# Patient Record
Sex: FEMALE | Race: OTHER | Employment: FULL TIME | URBAN - METROPOLITAN AREA
[De-identification: names, ages, dates, MRNs, and addresses within clinical notes are randomized per-mention and may not be internally consistent; named-entity substitution may affect disease eponyms.]

---

## 2021-07-07 ENCOUNTER — APPOINTMENT (OUTPATIENT)
Dept: CT IMAGING | Age: 21
End: 2021-07-07
Payer: MEDICAID

## 2021-07-07 ENCOUNTER — HOSPITAL ENCOUNTER (EMERGENCY)
Age: 21
Discharge: HOME | End: 2021-07-07
Payer: MEDICAID

## 2021-07-07 VITALS
DIASTOLIC BLOOD PRESSURE: 69 MMHG | SYSTOLIC BLOOD PRESSURE: 120 MMHG | HEART RATE: 75 BPM | WEIGHT: 140 LBS | OXYGEN SATURATION: 98 % | RESPIRATION RATE: 16 BRPM | TEMPERATURE: 98 F | HEIGHT: 63 IN | BODY MASS INDEX: 24.8 KG/M2

## 2021-07-07 DIAGNOSIS — R11.0 NAUSEA: ICD-10-CM

## 2021-07-07 DIAGNOSIS — R10.84 GENERALIZED ABDOMINAL PAIN: ICD-10-CM

## 2021-07-07 DIAGNOSIS — R19.7 DIARRHEA, UNSPECIFIED TYPE: Primary | ICD-10-CM

## 2021-07-07 DIAGNOSIS — E87.6 HYPOKALEMIA: ICD-10-CM

## 2021-07-07 LAB
ALBUMIN SERPL-MCNC: 4.6 G/DL (ref 3.5–5.2)
ALP BLD-CCNC: 54 U/L (ref 35–104)
ALT SERPL-CCNC: 26 U/L (ref 0–32)
ANION GAP SERPL CALCULATED.3IONS-SCNC: 12 MMOL/L (ref 7–16)
AST SERPL-CCNC: 22 U/L (ref 0–31)
BACTERIA: ABNORMAL /HPF
BASOPHILS ABSOLUTE: 0.04 E9/L (ref 0–0.2)
BASOPHILS RELATIVE PERCENT: 0.8 % (ref 0–2)
BILIRUB SERPL-MCNC: 1.1 MG/DL (ref 0–1.2)
BILIRUBIN URINE: NEGATIVE
BLOOD, URINE: NEGATIVE
BUN BLDV-MCNC: 11 MG/DL (ref 6–20)
CALCIUM SERPL-MCNC: 9.1 MG/DL (ref 8.6–10.2)
CHLORIDE BLD-SCNC: 104 MMOL/L (ref 98–107)
CLARITY: CLEAR
CO2: 24 MMOL/L (ref 22–29)
COLOR: YELLOW
CREAT SERPL-MCNC: 0.7 MG/DL (ref 0.5–1)
EOSINOPHILS ABSOLUTE: 0.33 E9/L (ref 0.05–0.5)
EOSINOPHILS RELATIVE PERCENT: 6.2 % (ref 0–6)
EPITHELIAL CELLS, UA: ABNORMAL /HPF
GFR AFRICAN AMERICAN: >60
GFR NON-AFRICAN AMERICAN: >60 ML/MIN/1.73
GLUCOSE BLD-MCNC: 78 MG/DL (ref 74–99)
GLUCOSE URINE: NEGATIVE MG/DL
HCG, URINE, POC: NEGATIVE
HCT VFR BLD CALC: 43 % (ref 34–48)
HEMOGLOBIN: 13.5 G/DL (ref 11.5–15.5)
IMMATURE GRANULOCYTES #: 0 E9/L
IMMATURE GRANULOCYTES %: 0 % (ref 0–5)
KETONES, URINE: NEGATIVE MG/DL
LACTIC ACID: 0.8 MMOL/L (ref 0.5–2.2)
LEUKOCYTE ESTERASE, URINE: NEGATIVE
LIPASE: 18 U/L (ref 13–60)
LYMPHOCYTES ABSOLUTE: 1.65 E9/L (ref 1.5–4)
LYMPHOCYTES RELATIVE PERCENT: 31.2 % (ref 20–42)
Lab: NORMAL
MAGNESIUM: 1.9 MG/DL (ref 1.6–2.6)
MCH RBC QN AUTO: 25.3 PG (ref 26–35)
MCHC RBC AUTO-ENTMCNC: 31.4 % (ref 32–34.5)
MCV RBC AUTO: 80.5 FL (ref 80–99.9)
MONOCYTES ABSOLUTE: 0.52 E9/L (ref 0.1–0.95)
MONOCYTES RELATIVE PERCENT: 9.8 % (ref 2–12)
MUCUS: PRESENT /LPF
NEGATIVE QC PASS/FAIL: NORMAL
NEUTROPHILS ABSOLUTE: 2.75 E9/L (ref 1.8–7.3)
NEUTROPHILS RELATIVE PERCENT: 52 % (ref 43–80)
NITRITE, URINE: NEGATIVE
PDW BLD-RTO: 14.1 FL (ref 11.5–15)
PH UA: 6 (ref 5–9)
PLATELET # BLD: 231 E9/L (ref 130–450)
PMV BLD AUTO: 10.5 FL (ref 7–12)
POSITIVE QC PASS/FAIL: NORMAL
POTASSIUM REFLEX MAGNESIUM: 3.1 MMOL/L (ref 3.5–5)
PROTEIN UA: NORMAL MG/DL
RBC # BLD: 5.34 E12/L (ref 3.5–5.5)
RBC UA: ABNORMAL /HPF (ref 0–2)
SODIUM BLD-SCNC: 140 MMOL/L (ref 132–146)
SPECIFIC GRAVITY UA: >=1.03 (ref 1–1.03)
TOTAL PROTEIN: 7.9 G/DL (ref 6.4–8.3)
UROBILINOGEN, URINE: 0.2 E.U./DL
WBC # BLD: 5.3 E9/L (ref 4.5–11.5)
WBC UA: ABNORMAL /HPF (ref 0–5)

## 2021-07-07 PROCEDURE — 83690 ASSAY OF LIPASE: CPT

## 2021-07-07 PROCEDURE — 99284 EMERGENCY DEPT VISIT MOD MDM: CPT

## 2021-07-07 PROCEDURE — 6360000004 HC RX CONTRAST MEDICATION: Performed by: RADIOLOGY

## 2021-07-07 PROCEDURE — 81001 URINALYSIS AUTO W/SCOPE: CPT

## 2021-07-07 PROCEDURE — 6370000000 HC RX 637 (ALT 250 FOR IP): Performed by: PHYSICIAN ASSISTANT

## 2021-07-07 PROCEDURE — 83735 ASSAY OF MAGNESIUM: CPT

## 2021-07-07 PROCEDURE — 83605 ASSAY OF LACTIC ACID: CPT

## 2021-07-07 PROCEDURE — 85025 COMPLETE CBC W/AUTO DIFF WBC: CPT

## 2021-07-07 PROCEDURE — 80053 COMPREHEN METABOLIC PANEL: CPT

## 2021-07-07 PROCEDURE — 74177 CT ABD & PELVIS W/CONTRAST: CPT

## 2021-07-07 RX ORDER — POTASSIUM CHLORIDE 20 MEQ/1
40 TABLET, EXTENDED RELEASE ORAL ONCE
Status: COMPLETED | OUTPATIENT
Start: 2021-07-07 | End: 2021-07-07

## 2021-07-07 RX ORDER — ONDANSETRON 4 MG/1
4 TABLET, ORALLY DISINTEGRATING ORAL EVERY 8 HOURS PRN
Qty: 10 TABLET | Refills: 0 | Status: SHIPPED | OUTPATIENT
Start: 2021-07-07 | End: 2022-05-25

## 2021-07-07 RX ORDER — DICYCLOMINE HYDROCHLORIDE 10 MG/1
20 CAPSULE ORAL 4 TIMES DAILY PRN
Qty: 20 CAPSULE | Refills: 0 | Status: SHIPPED | OUTPATIENT
Start: 2021-07-07 | End: 2022-05-25

## 2021-07-07 RX ADMIN — POTASSIUM CHLORIDE 40 MEQ: 1500 TABLET, EXTENDED RELEASE ORAL at 21:37

## 2021-07-07 RX ADMIN — IOPAMIDOL 75 ML: 755 INJECTION, SOLUTION INTRAVENOUS at 20:56

## 2021-07-07 NOTE — ED PROVIDER NOTES
ED Attending  CC: No                                                                                                                                      Department of Emergency Medicine   ED  Provider Note  Admit Date/RoomTime: 7/7/2021  6:37 PM  ED Room: 27/27        HPI:  7/7/21,   Time: 7:54 PM EDT         Jaky Rabago is a 19 y.o. female presenting to the ED for diarrhea and lower abdominal pain, beginning 5 days ago. The complaint has been intermittent, moderate in severity, and worsened by nothing. Patient presents to the emergency room with 5 days of lower abdominal pain and diarrhea. She states that prior to becoming ill she ate a traditional [address] [place] [address] dish that was made locally by another [address] [place] [address] woman. She states that no one else in the family had the same meal.  The patient states that her symptoms began soon after. She denies any recent antibiotics or travel. She is not pregnant. The patient denies hematuria, dysuria or polyuria. Her stools have been loose and watery. Denies any blood. She has had mild nausea but no vomiting. Patient has no history of inflammatory bowel disease. She is otherwise generally healthy.   She states that she has felt warm but has not documented any fevers or checked her temperature        ROS:     Constitutional: Negative for fever and chills  HENT: Negative for ear pain, sore throat and sinus pressure  Eyes: Negative for pain, discharge and redness  Respiratory:  Negative for shortness of breath, cough and wheezing  Cardiovascular: Negative for CP, edema or palpitations  Gastrointestinal:  See HPI  Genitourinary:  See HPI  Musculoskeletal: Negative for back pain and arthralgia  Skin: Negative for rash and wound  Neurological: Negative for weakness and headaches  Hematological: Negative for adenopathy    All other systems reviewed and are negative      -------------------------------- PAST HISTORY ----------------------------------  Past mg/dL    Total Protein 7.9 6.4 - 8.3 g/dL    Albumin 4.6 3.5 - 5.2 g/dL    Total Bilirubin 1.1 0.0 - 1.2 mg/dL    Alkaline Phosphatase 54 35 - 104 U/L    ALT 26 0 - 32 U/L    AST 22 0 - 31 U/L   Lipase   Result Value Ref Range    Lipase 18 13 - 60 U/L   Urinalysis, reflex to microscopic   Result Value Ref Range    Color, UA Yellow Straw/Yellow    Clarity, UA Clear Clear    Glucose, Ur Negative Negative mg/dL    Bilirubin Urine Negative Negative    Ketones, Urine Negative Negative mg/dL    Specific Gravity, UA >=1.030 1.005 - 1.030    Blood, Urine Negative Negative    pH, UA 6.0 5.0 - 9.0    Protein, UA TRACE Negative mg/dL    Urobilinogen, Urine 0.2 <2.0 E.U./dL    Nitrite, Urine Negative Negative    Leukocyte Esterase, Urine Negative Negative   Lactic Acid, Plasma   Result Value Ref Range    Lactic Acid 0.8 0.5 - 2.2 mmol/L   Microscopic Urinalysis   Result Value Ref Range    Mucus, UA Present (A) None Seen /LPF    WBC, UA 5-10 (A) 0 - 5 /HPF    RBC, UA 2-5 0 - 2 /HPF    Epithelial Cells, UA FEW /HPF    Bacteria, UA FEW (A) None Seen /HPF   Magnesium   Result Value Ref Range    Magnesium 1.9 1.6 - 2.6 mg/dL   POC Pregnancy Urine   Result Value Ref Range    HCG, Urine, POC Negative Negative    Lot Number [id number]     Positive QC Pass/Fail Acceptable     Negative QC Pass/Fail Acceptable        RADIOLOGY:  Interpreted by Radiologist.  CT ABDOMEN PELVIS W IV CONTRAST Additional Contrast? None   Final Result   Mild periportal edema which may be secondary to hepatic dysfunction or volume   overload. Fluid-filled nondilated small bowel which may represent enteritis. No bowel   obstruction. No other evidence of acute abdominal or pelvic pathology. ----------------- NURSING NOTES AND VITALS REVIEWED ---------------   The nursing notes within the ED encounter and vital signs as below have been reviewed.    /69   Pulse 75   Temp 98 °F (36.7 °C) (Oral)   Resp 16   Ht 5' 3\" (1.6 m)   Wt 140 lb (63.5 kg)   SpO2 98%   BMI 24.80 kg/m²   Oxygen Saturation Interpretation: Normal      --------------------------------PHYSICAL EXAM------------------------------------      Constitutional/General: Alert and oriented x3, well appearing, non toxic in NAD  Head: NC/AT  Eyes: PERRL, EOMI  Mouth: Oropharynx clear, handling secretions, no trismus  Neck: Supple, full ROM, no meningeal signs  Pulmonary: Lungs clear to auscultation bilaterally, no wheezes, rales, or rhonchi. Not in respiratory distress  Cardiovascular:  Regular rate and rhythm, no murmurs, gallops, or rubs. 2+ distal pulses  Abdomen: Soft, + BS. No distension. Mild diffuse abdominal tenderness. Nonfocal.  .  No palpable rigidity, rebound or guarding  Extremities: Moves all extremities x 4. Warm and well perfused  Skin: warm and dry without rash  Neurologic: GCS 15,  Intact. No focal deficits  Psych: Normal Affect      ------------------------ ED COURSE/MEDICAL DECISION MAKING----------------------  Medications   potassium chloride (KLOR-CON M) extended release tablet 40 mEq (40 mEq Oral Given 7/7/21 2137)   iopamidol (ISOVUE-370) 76 % injection 75 mL (75 mLs Intravenous Given 7/7/21 2056)         Medical Decision Making:    The patient was given a liter of fluids. Her labs did show a mild bump in her white blood count. CT scan of the abdomen and pelvis was pending at the completion of my shift. Care was transitioned to . Certainly if her CT of the is negative she will be discharged home with further instructions. She is aware and agreeable to this plan       Counseling: The emergency provider has spoken with the patient and discussed todays results, in addition to providing specific details for the plan of care and counseling regarding the diagnosis and prognosis.   Questions are answered at this time and they are agreeable with the plan.      ------------------------ IMPRESSION AND DISPOSITION -------------------------------    IMPRESSION  1. Diarrhea, unspecified type    2. Nausea    3. Hypokalemia    4.  Generalized abdominal pain        DISPOSITION  Disposition: Discharge to home  Patient condition is stable                   Chhaya Allan PA-C  07/09/21 3842

## 2021-07-07 NOTE — ED NOTES
FIRST PROVIDER CONTACT ASSESSMENT NOTE      Department of Emergency Medicine   7/7/21  2:30 PM EDT    Chief Complaint: Nausea (x5 days ), Diarrhea (x5 days ), and Abdominal Pain      History of Present Illness:   Domonique Mijares is a 19 y.o. female who presents to the ED for nausea and diarrhea for 5 days. She states she has generalized abdominal pain. She denies any urinary complaints. Denies any fevers. Denies any recent travel or recent antibiotic use. Medical History:  has no past medical history on file. Surgical History:  has no past surgical history on file. Social History:  reports that she has never smoked. She has never used smokeless tobacco. She reports that she does not drink alcohol and does not use drugs. Family History: family history is not on file. *ALLERGIES*     Patient has no known allergies.      Physical Exam:      VS:  BP 98/66   Pulse 93   Temp 97.4 °F (36.3 °C) (Tympanic)   Resp 15   Ht 5' 3\" (1.6 m)   Wt 140 lb (63.5 kg)   SpO2 99%   BMI 24.80 kg/m²      Initial Plan of Care:  Initiate Treatment-Testing, Proceed toTreatment Area When Bed Available for ED Attending/MLP to Continue Care    -----------------END OF FIRST PROVIDER CONTACT ASSESSMENT NOTE--------------  Electronically signed by NILESH Mcmillan CNP   DD: 7/7/21             NILESH Mcmillan CNP  07/07/21 0780

## 2022-05-18 ENCOUNTER — HOSPITAL ENCOUNTER (EMERGENCY)
Age: 22
Discharge: HOME | End: 2022-05-18
Attending: EMERGENCY MEDICINE
Payer: MEDICAID

## 2022-05-18 ENCOUNTER — APPOINTMENT (OUTPATIENT)
Dept: GENERAL RADIOLOGY | Age: 22
End: 2022-05-18
Payer: MEDICAID

## 2022-05-18 ENCOUNTER — APPOINTMENT (OUTPATIENT)
Dept: ULTRASOUND IMAGING | Age: 22
End: 2022-05-18
Payer: MEDICAID

## 2022-05-18 VITALS
OXYGEN SATURATION: 99 % | RESPIRATION RATE: 16 BRPM | TEMPERATURE: 97.5 F | DIASTOLIC BLOOD PRESSURE: 68 MMHG | HEIGHT: 68 IN | HEART RATE: 98 BPM | BODY MASS INDEX: 23.34 KG/M2 | WEIGHT: 154 LBS | SYSTOLIC BLOOD PRESSURE: 120 MMHG

## 2022-05-18 DIAGNOSIS — M79.605 LEFT LEG PAIN: Primary | ICD-10-CM

## 2022-05-18 PROCEDURE — 93971 EXTREMITY STUDY: CPT

## 2022-05-18 PROCEDURE — 99283 EMERGENCY DEPT VISIT LOW MDM: CPT

## 2022-05-18 PROCEDURE — 73610 X-RAY EXAM OF ANKLE: CPT

## 2022-05-18 RX ORDER — NAPROXEN 375 MG/1
375 TABLET ORAL 2 TIMES DAILY WITH MEALS
Qty: 60 TABLET | Refills: 0 | Status: SHIPPED | OUTPATIENT
Start: 2022-05-18

## 2022-05-18 ASSESSMENT — ENCOUNTER SYMPTOMS
ABDOMINAL PAIN: 0
BACK PAIN: 0
SHORTNESS OF BREATH: 0
COUGH: 0

## 2022-05-18 ASSESSMENT — PAIN - FUNCTIONAL ASSESSMENT: PAIN_FUNCTIONAL_ASSESSMENT: NONE - DENIES PAIN

## 2022-05-18 NOTE — ED PROVIDER NOTES
This is a 24-year-old female with no significant past medical history who presents to the ED for evaluation of leg pain. Patient states that for the past 1 to 2 days has been having some pain to her left calf and left ankle. Patient states she has not had any falls or trauma. Patient remarks that she is on birth control although has no swelling shortness of breath or chest pain. No reported dizziness on asking the patient. Patient remarks that she has no recent travel or recent surgeries. Patient is on her feet for work quite frequently denies any other heavy use repetitive use of her legs. No right-sided pain whatsoever. No other reported mitigating or exacerbating factors. The history is provided by the patient. Review of Systems   Constitutional: Negative for fever. HENT: Negative for congestion. Eyes: Negative for visual disturbance. Respiratory: Negative for cough and shortness of breath. Cardiovascular: Negative for chest pain. Gastrointestinal: Negative for abdominal pain. Endocrine: Negative for polyuria. Genitourinary: Negative for dysuria. Musculoskeletal: Negative for back pain. Skin: Negative for rash. Allergic/Immunologic: Negative for immunocompromised state. Neurological: Negative for headaches. Hematological: Does not bruise/bleed easily. Psychiatric/Behavioral: Negative for confusion. Physical Exam  Vitals and nursing note reviewed. Constitutional:       General: She is not in acute distress. Appearance: She is well-developed. HENT:      Head: Normocephalic and atraumatic. Mouth/Throat:      Mouth: Mucous membranes are moist.   Eyes:      Extraocular Movements: Extraocular movements intact. Neck:      Vascular: No JVD. Cardiovascular:      Rate and Rhythm: Normal rate and regular rhythm. Heart sounds: No murmur heard. Pulmonary:      Effort: Pulmonary effort is normal.      Breath sounds: No wheezing, rhonchi or rales. Chest:      Chest wall: No tenderness. Abdominal:      General: There is no distension. Palpations: Abdomen is soft. Tenderness: There is no abdominal tenderness. There is no guarding or rebound. Hernia: No hernia is present. Musculoskeletal:      Cervical back: Normal range of motion and neck supple. Right lower leg: No edema. Left lower leg: No edema. Comments: Negative homans sign   Skin:     General: Skin is warm and dry. Capillary Refill: Capillary refill takes less than 2 seconds. Neurological:      General: No focal deficit present. Mental Status: She is alert and oriented to person, place, and time. Cranial Nerves: No cranial nerve deficit. Psychiatric:         Mood and Affect: Mood normal.         Behavior: Behavior normal.          Procedures     MDM  Number of Diagnoses or Management Options  Left leg pain  Diagnosis management comments: Patient is a pleasant 64-year-old female who presented the ED for evaluation of calf and ankle pain that she noticed when she awoke today. Patient was nontoxic no distress however the concern was that she is on birth control was a DVT given there was no trauma recently. Compartments are soft patient neurovascular tact full range of motion. Ultrasound reassuring no signs of DVT imaging showed no signs of abnormalities patient was advised use ice elevation rest and given a short course of anti-inflammatories. Of note somewhere in the triage note says the patient was dizzy however on questioning patient she has no dizziness whatsoever. Patient appropriate for outpatient follow-up given return precautions.                  --------------------------------------------- PAST HISTORY ---------------------------------------------  Past Medical History:  has no past medical history on file. Past Surgical History:  has no past surgical history on file. Social History:  reports that she has never smoked.  She has never used smokeless tobacco. She reports that she does not drink alcohol and does not use drugs. Family History: family history is not on file. The patients home medications have been reviewed. Allergies: Patient has no known allergies. -------------------------------------------------- RESULTS -------------------------------------------------  Labs:  No results found for this visit on 05/18/22. Radiology:  US DUP LOWER EXTREMITY LEFT DELMY   Final Result   No evidence of DVT in the left lower extremity. XR ANKLE LEFT (MIN 3 VIEWS)   Final Result   No acute abnormality of the ankle.             ------------------------- NURSING NOTES AND VITALS REVIEWED ---------------------------  Date / Time Roomed:  5/18/2022  3:52 PM  ED Bed Assignment:  [facility]/[place] [id number]    The nursing notes within the ED encounter and vital signs as below have been reviewed. /68   Pulse 98   Temp 97.5 °F (36.4 °C) (Oral)   Resp 16   Ht 5' 8\" (1.727 m)   Wt 154 lb (69.9 kg)   LMP 04/22/2022   SpO2 99%   BMI 23.42 kg/m²   Oxygen Saturation Interpretation: Normal      ------------------------------------------ PROGRESS NOTES ------------------------------------------  8:37 PM EDT  I have spoken with the patient and discussed todays results, in addition to providing specific details for the plan of care and counseling regarding the diagnosis and prognosis. Their questions are answered at this time and they are agreeable with the plan. I discussed at length with them reasons for immediate return here for re evaluation. They will followup with their and the on call primary care physician, general surgeon and orthopedic physician by calling their office tomorrow and on Monday.      --------------------------------- ADDITIONAL PROVIDER NOTES ---------------------------------  At this time the patient is without objective evidence of an acute process requiring hospitalization or inpatient management.   They have remained hemodynamically stable throughout their entire ED visit and are stable for discharge with outpatient follow-up. The plan has been discussed in detail and they are aware of the specific conditions for emergent return, as well as the importance of follow-up. Discharge Medication List as of 5/18/2022  6:33 PM      START taking these medications    Details   naproxen (NAPROSYN) 375 MG tablet Take 1 tablet by mouth 2 times daily (with meals), Disp-60 tablet, R-0Print             Diagnosis:  1. Left leg pain        Disposition:  Patient's disposition: Discharge to home  Patient's condition is stable.            --------------------------------------------- PAST HISTORY ---------------------------------------------  Past Medical History:  has no past medical history on file. Past Surgical History:  has no past surgical history on file. Social History:  reports that she has never smoked. She has never used smokeless tobacco. She reports that she does not drink alcohol and does not use drugs. Family History: family history is not on file. The patients home medications have been reviewed. Allergies: Patient has no known allergies. -------------------------------------------------- RESULTS -------------------------------------------------  Labs:  No results found for this visit on 05/18/22. Radiology:  US DUP LOWER EXTREMITY LEFT DELMY   Final Result   No evidence of DVT in the left lower extremity. XR ANKLE LEFT (MIN 3 VIEWS)   Final Result   No acute abnormality of the ankle.             ------------------------- NURSING NOTES AND VITALS REVIEWED ---------------------------  Date / Time Roomed:  5/18/2022  3:52 PM  ED Bed Assignment:  [id number]    The nursing notes within the ED encounter and vital signs as below have been reviewed.    /68   Pulse 98   Temp 97.5 °F (36.4 °C) (Oral)   Resp 16   Ht 5' 8\" (1.727 m)   Wt 154 lb (69.9 kg)   LMP 04/22/2022   SpO2 99%   BMI 23.42 kg/m²   Oxygen Saturation Interpretation: Normal      ------------------------------------------ PROGRESS NOTES ------------------------------------------  8:35 PM EDT  I have spoken with the patient and discussed todays results, in addition to providing specific details for the plan of care and counseling regarding the diagnosis and prognosis. Their questions are answered at this time and they are agreeable with the plan. I discussed at length with them reasons for immediate return here for re evaluation. They will followup with their PCP      --------------------------------- ADDITIONAL PROVIDER NOTES ---------------------------------  At this time the patient is without objective evidence of an acute process requiring hospitalization or inpatient management. They have remained hemodynamically stable throughout their entire ED visit and are stable for discharge with outpatient follow-up. The plan has been discussed in detail and they are aware of the specific conditions for emergent return, as well as the importance of follow-up. Discharge Medication List as of 5/18/2022  6:33 PM      START taking these medications    Details   naproxen (NAPROSYN) 375 MG tablet Take 1 tablet by mouth 2 times daily (with meals), Disp-60 tablet, R-0Print             Diagnosis:  1. Left leg pain        Disposition:  Patient's disposition: Discharge to home  Patient's condition is stable.      Laura Gale DO  05/19/22 2192

## 2022-05-18 NOTE — Clinical Note
Marii De La Torre was seen and treated in our emergency department on 5/18/2022. She may return to work on 05/20/2022. If you have any questions or concerns, please don't hesitate to call.       Laura Gale, DO

## 2022-05-25 ENCOUNTER — OFFICE VISIT (OUTPATIENT)
Dept: PRIMARY CARE CLINIC | Age: 22
End: 2022-05-25
Payer: MEDICAID

## 2022-05-25 VITALS
BODY MASS INDEX: 24.92 KG/M2 | WEIGHT: 158.8 LBS | RESPIRATION RATE: 20 BRPM | OXYGEN SATURATION: 100 % | DIASTOLIC BLOOD PRESSURE: 60 MMHG | HEART RATE: 87 BPM | TEMPERATURE: 97.5 F | HEIGHT: 67 IN | SYSTOLIC BLOOD PRESSURE: 100 MMHG

## 2022-05-25 DIAGNOSIS — E87.6 HYPOKALEMIA: ICD-10-CM

## 2022-05-25 DIAGNOSIS — Z11.4 ENCOUNTER FOR SCREENING FOR HIV: ICD-10-CM

## 2022-05-25 DIAGNOSIS — Z11.59 NEED FOR HEPATITIS C SCREENING TEST: ICD-10-CM

## 2022-05-25 DIAGNOSIS — Z00.00 ANNUAL PHYSICAL EXAM: ICD-10-CM

## 2022-05-25 DIAGNOSIS — M54.50 RIGHT-SIDED LOW BACK PAIN WITHOUT SCIATICA, UNSPECIFIED CHRONICITY: ICD-10-CM

## 2022-05-25 DIAGNOSIS — M25.571 RIGHT ANKLE PAIN, UNSPECIFIED CHRONICITY: ICD-10-CM

## 2022-05-25 DIAGNOSIS — Z00.00 ANNUAL PHYSICAL EXAM: Primary | ICD-10-CM

## 2022-05-25 LAB
ANION GAP SERPL CALCULATED.3IONS-SCNC: 8 MMOL/L (ref 7–16)
BUN BLDV-MCNC: 8 MG/DL (ref 6–20)
CALCIUM SERPL-MCNC: 9 MG/DL (ref 8.6–10.2)
CHLORIDE BLD-SCNC: 104 MMOL/L (ref 98–107)
CO2: 27 MMOL/L (ref 22–29)
CREAT SERPL-MCNC: 0.7 MG/DL (ref 0.5–1)
GFR AFRICAN AMERICAN: >60
GFR NON-AFRICAN AMERICAN: >60 ML/MIN/1.73
GLUCOSE BLD-MCNC: 77 MG/DL (ref 74–99)
POTASSIUM SERPL-SCNC: 4.2 MMOL/L (ref 3.5–5)
SODIUM BLD-SCNC: 139 MMOL/L (ref 132–146)

## 2022-05-25 PROCEDURE — 99385 PREV VISIT NEW AGE 18-39: CPT | Performed by: STUDENT IN AN ORGANIZED HEALTH CARE EDUCATION/TRAINING PROGRAM

## 2022-05-25 ASSESSMENT — PATIENT HEALTH QUESTIONNAIRE - PHQ9
SUM OF ALL RESPONSES TO PHQ QUESTIONS 1-9: 0
SUM OF ALL RESPONSES TO PHQ QUESTIONS 1-9: 0
2. FEELING DOWN, DEPRESSED OR HOPELESS: 0
SUM OF ALL RESPONSES TO PHQ QUESTIONS 1-9: 0
SUM OF ALL RESPONSES TO PHQ QUESTIONS 1-9: 0
SUM OF ALL RESPONSES TO PHQ9 QUESTIONS 1 & 2: 0
1. LITTLE INTEREST OR PLEASURE IN DOING THINGS: 0

## 2022-05-25 NOTE — PATIENT INSTRUCTIONS
Patient Education        Hernia de disco: Ejercicios  Herniated Disc: Exercises  Introducción  Estos son [place] de ejercicios que usted puede probar. Los ejercicios pueden sugerirse para timmy afección o para la rehabilitación. Comience cada ejercicio lentamente. Reduzca la intensidad de los ejercicios si empieza asentir dolor. Se le informará cuándo comenzar a hacer estos ejercicios y cuáles funcionaránmejor para usted. Cómo mantenerse seguro  Estos ejercicios pueden ayudarlo a moverse con más facilidad y a sentirse mejor. Luna cuando comienza a hacerlos, puede sentir más dolor en la espalda. [place] es normal. Luna es importante que preste mucha atención a mc dolor durantey después de cada ejercicio.  Siga haciendo estos ejercicios si mc dolor permanece igual o si pasa de mc pierna y nalga (glúteo) más hacia la mitad de la columna vertebral. Es timmy buena señal que mc dolor se retire de mc pierna y nalga.  Deje de hacer estos ejercicios si el dolor empeora en mc pierna y nalga. Deje de hacer los ejercicios si comienza a sentir dolor en la pierna y en la nalga que no tenía antes. Asegúrese de hacer estos ejercicios en el orden en que aparecen. Antes deseguir con el siguiente, note cómo cambia el dolor. Si el dolor es mucho peor katie después del ejercicio y sigue peor al díasiguiente, no jessica ninguno de estos ejercicios. Cómo se hacen los ejercicios  1. Acuéstese boca abajo    1. Acuéstese boca abajo, con la ashli mirando hacia un lado. 2. Trate de relajar los músculos de la parte baja de la espalda todo lo que pueda. 3. Siga acostado boca abajo abhishek 2 minutos.  Mantenga los brazos a ambos lados del cuerpo.  Si smooth posición es incómoda para el cheryl, coloque las [address] la otra, debajo de la frente. [place] le ayudará a sostener la ashli y el cheryl.   Si estar acostado en esta posición le da dolor en la pierna o lo empeora, interrumpa isela ejercicio y no jessica los siguientes ejercicios. 2. Flexión para extender la espalda    1. Acuéstese boca abajo, mirando hacia abajo y con los codos flexionados a los costados y debajo de los hombros.  2. Presione con los codos hacia abajo contra el piso para elevar la parte superior de la espalda. 3. Mantenga esta posición por entre 15 y 27 segundos. 4. Repita de 2 a 4 veces.  Mientras hace esto, relaje los músculos del estómago y deje que la espalda se arquee sin usar los músculos de la espalda.  Deje que la parte baja de la espalda se relaje completamente mientras se arquea. [address] pelvis presionadas contra el piso sin levantarse. Luego relájese y vuelva a la posición inicial. Con el tiempo, practique hastamantenerse en la posición de flexión por hasta 2 minutos. Si estar acostado en esta posición le da dolor en la pierna o lo empeora, interrumpa isela ejercicio y no jessica los siguientes ejercicios. 3. Flexiones completas    1. Acuéstese sobre mc abdomen, boca abajo. Mantenga los codos apretados contra los costados y bajo los hombros.  2. Enderece los codos y [employer] parte superior del cuerpo hacia arriba tanto morro pueda. 3. Mantenga esta posición abhishek 5 segundos y luego relájese. 4. Repita 10 veces. Permita que la parte baja de la espalda se hunda. Mantenga sarahi caderas, pelvisy piernas relajadas. Cada vez, trate de elevar la parte superior de mc cuerpo un poco más arriba [place] brazos un poco más derechos. Si el dolor se propaga hacia abajo por la pierna o aumenta hacia abajo por la pierna, detenga isela ejercicio y no pase al siguiente ejercicio. Si no puede hacer isela ejercicio, puede intentar el ejercicio siguiente deinclinación hacia atrás. 4. Inclinación hacia atrás    1. Párese con los pies separados al ancho de las caderas y sin trabar [place]. 2. Coloque las tanika en la parte baja de la espalda a la altura de la cintura.   3. Inclínese hacia atrás lo [employer] pueda, manteniendo [place] estiradas. 4. Repita de 2 a 4 veces. Los dedos de los pies deben apuntar hacia adelante. Mantenga esta posición por 2 o 3 segundos. Luego vuelva a la posición inicial.  Cada vez, trate de inclinarse hacia atrás un poquito más, hasta que puedainclinarse hacia atrás lo más que pueda. Si pararse en esta posición le da dolor en la pierna o lo empeora, interrumpa isela ejercicio. La atención de seguimiento es timmy parte clave de mc tratamiento y seguridad. Asegúrese de hacer y acudir a todas las citas, y llame a mc médico si está teniendo problemas. También es timmy buena idea saber los [place] de susexámenes y mantener timmy lista de los medicamentos que aissatou. ¿Dónde puede encontrar más información en inglés? Isadora Webster a [email or web address] e ingrese a mc cuenta de MyChart. Josence Teo B024 en el Lester Quick \"Search Health Information\" para más información (en inglés) sobre \"Hernia de disco: Ejercicios. \"     Si no tiene timmy cuenta, jessica esther en el enlace \"Sign Up Now\". Revisado: 1 julio, 2021               Versión del contenido: 13.2  © 9403-5284 Healthwise, Incorporated. Las instrucciones de cuidado fueron adaptadas bajo licencia por [facility] ([facility]). Si usted tiene [place] afección médica o sobre estas instrucciones, siempre pregunte a mc profesional de danay. Healthwise, Incorporated niega toda garantía o responsabilidad por mc uso de esta información. Patient Education        Artritis de la parte baja de la espalda: Ejercicios  Low Back Arthritis: Exercises  Instrucciones de cuidado  Éstos son algunos ejemplos de ejercicios típicos de rehabilitación para mc afección. Comience cada ejercicio lentamente. Reduzca la intensidad delejercicio si [place] a sentir dolor. Mc médico o el fisioterapeuta le dirán cuándo puede comenzar con estosejercicios y cuáles funcionarán mejor para usted. Cuando no esté activo, encuentre timmy posición cómoda para descansar.  Algunas personas se sienten cómodas en el piso o en timmy cama de firmeza media con timmy almohada pequeña debajo de la ashli y otra debajo de sarahi rodillas. Otras personas prefieren acostarse de lado con timmy almohada entre las rodillas. Nopermanezca en timmy posición por [employer]. Dé paseos cortos (10 a 20 minutos) cada 2 a 3 horas. Evite las pendientes, las colinas y las escaleras hasta que se sienta mejor. Sólo camine distancias quepueda manejar sin dolor, especialmente dolor en las piernas. Cómo se hacen los ejercicios  Inclinación de la pelvis    1. Acuéstese boca arriba con las rodillas flexionadas. 2. \"Afirme\" mc estómago, apriete los músculos hundiéndolos e imaginando que mc ombligo se desplaza hacia la columna vertebral.  3. Laci presión hacia el piso con la parte baja de la espalda. Debe sentir que sarahi caderas y pelvis se balancean hacia atrás. 4. Mantenga la posición abhishek 6 segundos mientras respira de Jes pausada. 5. Relájese y deje que mc pelvis y caderas se balanceen hacia adelante. 6. Repita de 8 a 12 veces. Estiramientos de la espalda    1. [address] y las rodillas en el piso. 2. Relaje la ashli y [facility]. Arquee la espalda hacia el techo, hasta que sienta que las partes dk, media y baja se estiran agradablemente. Mantenga isela estiramiento abhishek el tiempo que se sienta cómodo, o de 15 a 30 segundos. 3. Vuelva a la posición inicial con la espalda plana mientras está apoyado de tanika y rodillas. 4. Deje que mc espalda se nivele empujando el [employer]. [address] (glúteos) hacia el techo. 5. Mantenga esta posición abhishek 15 a 30 segundos. 6. Repita de 2 a 4 veces. La atención de seguimiento es timmy parte clave de mc tratamiento y seguridad. Asegúrese de hacer y acudir a todas las citas, y llame a mc médico si está teniendo problemas. También es timmy buena idea saber los [place] de susexámenes y mantener timmy lista de los medicamentos que aissatou.   ¿Dónde puede encontrar más información en inglés? Letty Destini a [email or web address] e ingrese a mc cuenta de MyChart. Yoselin Karan A445 en el [facility] \"Search Health Information\" para más información (en inglés) sobre \"Artritis de la parte baja de la espalda: Ejercicios. \"     Si no tiene timmy cuenta, laci clic en el enlace \"Sign Up Now\". Revisado: 1 julio, 2021               Versión del contenido: 13.2  © 6350-9386 Healthwise, Incorporated. Las instrucciones de cuidado fueron adaptadas bajo licencia por [facility] ([facility]). Si usted tiene [place] afección médica o sobre estas instrucciones, siempre pregunte a mc profesional de danay. Healthwise, Incorporated niega toda garantía o responsabilidad por mc uso de esta información. Patient Education        Dolor en la parte baja de la espalda (lumbalgia): Ejercicios  Low Back Pain: Exercises  Instrucciones de cuidado  Aquí se presentan algunos ejemplos de ejercicios típicos de rehabilitación para tratar mc afección. Empiece cada ejercicio lentamente. Reduzca la intensidaddel ejercicio si [place] a tener dolor. Mc médico o fisioterapeuta le dirán cuándo puede comenzar con estos ejerciciosy cuáles funcionarán mejor para usted. Cómo hacer los ejercicios  [place]    1. Acuéstese boca abajo, apoyando mc cuerpo con los antebrazos. 2. Laci presión con los codos en el piso para elevar la espalda. Al hacer esto, relaje los músculos del estómago y permita que mc espalda se arquee sin usar los músculos de la espalda. Al hacer presión, evite que las caderas o la pelvis se separen del piso. 3. Mantenga la posición de 15 a 30 segundos y luego relájese. 4. Repita de 2 a 4 veces. Ejercicio de alternar brazo y pierna (berna de caza)    Laci isela ejercicio lentamente. Trate de mantener el cuerpo [place], y no deje que timmy cadera caiga más abajo que la [place]. 1. Comience con las tanika y las rodillas en el piso. 2. Contraiga los músculos del abdomen.   3. Buel Neema Joseph Moment del suelo y manténgala recta detrás de usted. Tenga cuidado de no dejar caer la cadera hacia abajo, porque eso hará que se le tuerza el tronco.  4. Mantenga la posición abhishek unos 6 segundos y luego baje la pierna y [place] a la otra pierna. 5. Repita de 8 a 12 veces con cada pierna. 6. Con el tiempo, vaya aumentando [employer] de 10 a 30 segundos cada vez.  7. Si se siente estable y seguro con la pierna elevada, intente levantar el brazo opuesto directamente enfrente de usted al [facility]. Ejercicio de rodillas al pecho    1. Acuéstese boca arriba con las rodillas flexionadas y los pies apoyados sobre el piso. 2. Lleve timmy rodilla hacia el pecho, manteniendo el otro pie apoyado en el suelo (o dejando la otra pierna recta, morro lo sienta mejor en la parte baja de la espalda). 3. Mantenga la parte baja de la espalda presionada contra el suelo. Sosténgalo por lo menos de 15 a 30 segundos. 4. Relájese y regrese la rodilla a la posición inicial.  5. Repita el ejercicio con la otra pierna. Repita de 2 a 4 veces con cada pierna. 6. Para lograr mayor estiramiento, deje la otra pierna apoyada en el suelo mientras se jose la rodilla [place]. Abdominales    1. Acuéstese en el suelo boca arriba, con las rodillas dobladas en un ángulo de 90 grados. Los pies deben estar apoyados en el suelo, a unas 12 pulgadas (30 cm) de las nalgas (glúteos). 2. Cruce los [facility]. Si esto le causa molestias en el cheryl, pruebe a poner las tanika detrás del cheryl (no de la ashli), con los codos abiertos. 3. Contraiga lentamente los músculos del abdomen y eleve los omóplatos del suelo. 4. Mantenga la ashli alineada con el cuerpo y no presione la barbilla hacia el pecho. 5. Sostenga esta posición por 1 o 2 segundos y después baje lentamente de nuevo hacia el suelo. 6. Repita de 8 a 12 veces. Ejercicio de inclinación de pelvis    1. Acuéstese de espalda con las rodillas flexionadas. 2.  \"Tense\" mc estómago. [place] significa apretar los músculos contrayendo el ombligo e imaginando que se mueve hacia la columna vertebral. Deberá sentir morro si la espalda estuviera ejerciendo presión sobre el suelo y que las caderas y la pelvis se balancean hacia atrás. 3. Mantenga la posición abhishek aproximadamente 6 segundos mientras respira suavemente. 4. Repita de 8 a 12 veces. Lopez con el talón    1. Acuéstese boca arriba con ambas rodillas flexionadas y los tobillos doblados de manera que solo los talones estén sobre el piso. Las rodillas deben estar dobladas más o menos a 90 grados. 2. A continuación jessica presión con los talones en el suelo, apriete las nalgas (glúteos) y levante las caderas del suelo hasta que los hombros, las caderas y las rodillas estén en línea recta. 3. Mantenga la posición abhishek unos 6 segundos mientras sigue respirando normalmente, y luego baje lentamente las caderas hacia el piso y descanse por hasta 10 segundos. [address] a 12 repeticiones. Estiramiento de isquiotibiales en el marivel de timmy nelly    1. Acuéstese boca arriba en el marivel de [place], con timmy pierna a través de la [place]. 2. Deslice la pierna hacia arriba por la pared, para enderezar la rodilla. Debe sentir un leve estiramiento en la parte posterior de la pierna. 3. Sostenga el estiramiento por lo menos de 15 a 30 segundos. No arquee la espalda, estire los dedos de los pies ni flexione las rodillas. Mantenga un talón tocando el suelo y el otro tocando la pared. 4. Repita con la otra pierna. 5. Repita de 2 a 4 veces con cada pierna. Estiramiento de los músculos flexores de la cadera    1. Póngase de rodillas en el suelo con timmy Jim Best y Maridee Stalling atrás. Coloque la rodilla de adelante encima del pie. Mantenga la otra rodilla en contacto con el suelo.   2. Empuje lentamente la cadera hacia adelante hasta que sienta un estiramiento en la parte superior del muslo de la pierna que [place] atrás.  3. Sostenga el estiramiento por lo menos de 15 a 30 segundos. Repita con la otra pierna. 4. Repita de 2 a 4 veces a cada lado. Sentadillas de pared    1. Párese con la espalda a entre 10 y 12 pulgadas (25 a 30 cm) de distancia de la pared. 2. Inclínese hacia la pared [employer] la espalda quede plana sobre jeff. 3. Deslícese lentamente hacia abajo hasta que las rodillas estén ligeramente flexionadas, presionando la parte baja de la espalda contra la pared. 4. Mantenga la posición abhishek unos 6 segundos y después deslícese hacia arriba por la pared. 5. Repita de 8 a 12 veces. La atención de seguimiento es timmy parte clave de mc tratamiento y seguridad. Asegúrese de hacer y acudir a todas las citas, y llame a mc médico si está teniendo problemas. También es timmy buena idea saber los [place] de susexámenes y mantener timmy lista de los medicamentos que aissatou. ¿Dónde puede encontrar más información en inglés? [facility] a [email or web address] e ingrese a mc cuenta de MyChart. Raúl So X771 en el Jessica Avelar \"Search Health Information\" para más información (en inglés) sobre \"Dolor en la parte baja de la espalda (lumbalgia): Ejercicios. \"     Si no tiene timmy cuenta, jessica esther en el enlace \"Sign Up Now\". Revisado: 1 julio, 2021               Versión del contenido: 13.2  © 8342-7925 Healthwise, [employer]. Las instrucciones de cuidado fueron adaptadas bajo licencia por [facility] ([facility]). Si usted tiene [place] afección médica o sobre estas instrucciones, siempre pregunte a mc profesional de danay. [place], Incorporated niega toda garantía o responsabilidad por mc uso de esta información. Patient Education        Dolor di en la parte baja de la espalda: Ejercicios  Acute Low Back Pain: Exercises  Instrucciones de cuidado  Éstos son algunos ejemplos de ejercicios típicos de rehabilitación para mc afección. Comience cada ejercicio lentamente.  Reduzca la intensidad delejercicio si Hector Mcmanus a sentir dolor. Mc médico o el fisioterapeuta le dirán cuándo puede comenzar con estosejercicios y cuáles funcionarán mejor para usted. Cuando no esté activo, encuentre timmy posición cómoda para descansar. Algunas personas se sienten cómodas en el piso o en timmy cama de firmeza media con timmy almohada pequeña debajo de la ashli y otra debajo de sarahi rodillas. Otras personas prefieren acostarse de lado con timmy almohada entre las rodillas. Nopermanezca en timmy posición por [facility]. Dé paseos cortos (10 a 20 minutos) cada 2 a 3 horas. Evite las pendientes, las colinas y las escaleras hasta que se sienta mejor. Sólo camine distancias quepueda manejar sin dolor, especialmente dolor en las piernas. Cómo se hacen los ejercicios  Estiramientos de la espalda    7. [address] [facility] y las rodillas en el piso. 8. Relaje la ashli y [facility]. Arquee la espalda hacia el techo, hasta que sienta que las partes dk, media y baja se estiran agradablemente. Mantenga isela estiramiento abhishek el tiempo que se sienta cómodo, o de 15 a 30 segundos. 9. Vuelva a la posición inicial con la espalda plana mientras está apoyado de tanika y rodillas. 10. Deje que mc espalda se nivele empujando el [facility]. [address] [facility] (glúteos) hacia el techo. 11. Mantenga esta posición abhishek 15 a 30 segundos. 12. Repita de 2 a 4 veces. La atención de seguimiento es timmy parte clave de mc tratamiento y seguridad. Asegúrese de hacer y acudir a todas las citas, y llame a mc médico si está teniendo problemas. También es timmy buena idea saber los [place] de susexámenes y mantener timmy lista de los medicamentos que aissatou. ¿Dónde puede encontrar más información en inglés? Aleksandar Horvath a [email or web address] e ingrese a mc cuenta de MyChart. [place] [address] en el [facility] Kevin \"Search Health Information\" para más información (en inglés) sobre \"Dolor di en la parte baja de la espalda: Ejercicios. \"     Si no tiene Jerrald Linda Jessica santana en el enlace \"Sign Up Now\". Revisado: 1 julio, 2021               Versión del contenido: 13.2  © 3676-6435 Healthwise, [employer]. Las instrucciones de cuidado fueron adaptadas bajo licencia por [facility] ([facility]). Si usted tiene [place] afección médica o sobre estas instrucciones, siempre pregunte a mc profesional de danay. S.N. [employer], [employer] niega toda garantía o responsabilidad por mc uso de esta información.

## 2022-05-25 NOTE — PROGRESS NOTES
[facility]  Department of Family Medicine      Patient:  Rafat Myrick 23 y.o. female     Date of Service: 5/25/22      Chief complaint:   Chief Complaint   Patient presents with   [address]     ED follow up          History ofPresent Illness   The patient is a 23 y.o. female  presented to the clinic with complaints as above. 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     -currently, feeling much improved, naproxen is helping a lot      Hx of gastritis/gerd  -well controlled, not on any medications and having no issues      Hx of right lesion of ankle - had ankle pain in past, got imaging, showed possible enostosis, has ankle pain from time to time, especially with squating to  animals   -did fall while in [employer] and hurt right ankle and right lower back  -states with working a lot or being on her feet a lot, will get worsening back pain with swelling  -moving bowels and urinating normally  -does take advil for this which helps sometimes   -can work through the pain     Past Medical History:  No past medical history on file. PastSurgical History:    History reviewed. No pertinent surgical history. Allergies:    Patient has no known allergies.     Social History:   Social History     Socioeconomic History    Marital status: Single     Spouse name: Not on file    Number of children: Not on file    Years of education: Not on file    Highest education level: Not on file   Occupational History    Not on file   Tobacco Use    Smoking status: Never Smoker    Smokeless tobacco: Never Used   Substance and Sexual Activity    Alcohol use: Never    Drug use: Never    Sexual activity: Yes     Partners: Male   Other Topics Concern    Not on file   Social History Narrative    Not on file     Social Determinants of Health     Financial Resource Strain:     Difficulty of Paying Living Expenses: Not on file   Food Insecurity:     Worried About Running Out of Food in the Last Year: Not on file    Silke of Food in the Last Year: Not on file   Transportation Needs:     Lack of Transportation (Medical): Not on file    Lack of Transportation (Non-Medical): Not on file   Physical Activity:     Days of Exercise per Week: Not on file    Minutes of Exercise per Session: Not on file   Stress:     Feeling of Stress : Not on file   Social Connections:     Frequency of Communication with Friends and Family: Not on file    Frequency of Social Gatherings with Friends and Family: Not on file    Attends Islam Services: Not on file    Active Member of [address] [place] [address] [employer] or Organizations: Not on file    Attends Club or Organization Meetings: Not on file    Marital Status: Not on file   Intimate Partner Violence:     Fear of Current or Ex-Partner: Not on file    Emotionally Abused: Not on file    Physically Abused: Not on file    Sexually Abused: Not on file   Housing Stability:     Unable to Pay for Housing in the Last Year: Not on file    Number of [place] in the Last Year: Not on file    Unstable Housing in the Last Year: Not on file        Family History:       Problem Relation Age of Onset    Diabetes Paternal Grandmother     Hypertension Paternal Grandmother     Cancer Paternal Grandmother     Cancer Paternal Grandfather        Review of Systems:   Review of Systems - as above     Physical Exam   Vitals: /60   Pulse 87   Temp 97.5 °F (36.4 °C) (Infrared)   Resp 20   Ht 5' 7\" (1.702 m)   Wt 158 lb 12.8 oz (72 kg)   SpO2 100%   BMI 24.87 kg/m²   Physical Exam  Constitutional:       Appearance: She is well-developed. HENT:      Head: Normocephalic. Cardiovascular:      Rate and Rhythm: Normal rate and regular rhythm. Heart sounds: Normal heart sounds.  No murmur heard.      Pulmonary:      Effort: Pulmonary effort is normal. No respiratory distress. Breath sounds: Normal breath sounds. No wheezing. Abdominal:      General: Bowel sounds are normal.      Palpations: Abdomen is soft. Musculoskeletal:         General: Tenderness present. Normal range of motion. Comments: Minimal pain with low back ROM testing   No pain with palpation of lateral malleolus    Skin:     General: Skin is warm and dry. Neurological:      Mental Status: She is alert and oriented to person, place, and time. Psychiatric:         Behavior: Behavior normal.         Assessment and Plan       1. Annual physical exam  Needs lab work and screening as below  - Basic Metabolic Panel; Future  - Hepatitis C Antibody; Future  - HIV Screen; Future    2. Right-sided low back pain without sciatica, unspecified chronicity  Has been going on for a while  -Patient able to tolerate pain and work through pain, will trial conservative measures for now with home exercises and PRN NSAID    3. Right ankle pain, unspecified chronicity  As above  -Has likely enostosis lesion, which is benign, gets ankle pain intermittently, will just watch for now, if worsening will re image     4. Hypokalemia  Incidental finding, will get repeat   - Basic Metabolic Panel; Future    5. Encounter for screening for HIV  HCM:  - HIV Screen; Future    6. Need for hepatitis C screening test  HCM:  - Hepatitis C Antibody; Future      Counseled regarding above diagnosis, including possible risks and complications,  especially if left uncontrolled. Counseled regarding the possible side effects, risks, benefits and alternatives to treatment;patient and/or guardian verbalizes understanding, agrees, feels comfortable with and wishes to proceed with above treatment plan.     Call or go to [address] [place] if symptoms worsen or persist. Advised patient to call with any new medication issues, and, as applicable, read all Rx info from pharmacy to assure aware of all possible risks and side effects of medicationbefore taking. Patient and/or guardian given opportunity to ask questions/raise concerns. The patient verbalized comfort and understanding ofinstructions. I encourage further reading and education about your health conditions. Information on many health conditions is provided by [facility] [employer]: [email or web address]. org/  Please bring any questions to me at your nextvisit. Return to Office: Return in about 1 year (around 5/25/2023), or if symptoms worsen or fail to improve, for annual physical .    Medication List:    Current Outpatient Medications   Medication Sig Dispense Refill    naproxen (NAPROSYN) 375 MG tablet Take 1 tablet by mouth 2 times daily (with meals) 60 tablet 0    levonorgestrel-ethinyl estradiol (AVIANE;ALESSE;LESSINA) 0.1-20 MG-MCG per tablet Take 1 tablet by mouth daily 1 packet 2     No current facility-administered medications for this visit. Kristine Beckford, DO       This document may have been prepared at least partially through the use of voice recognition software. Although effort is taken to assure the accuracy ofthis document, it is possible that grammatical, syntax,  or spelling errors may occur.

## 2022-05-26 LAB
HEPATITIS C ANTIBODY INTERPRETATION: NORMAL
HIV-1 AND HIV-2 ANTIBODIES: NORMAL

## 2022-06-13 ENCOUNTER — TELEPHONE (OUTPATIENT)
Dept: PRIMARY CARE CLINIC | Age: 22
End: 2022-06-13

## 2022-06-13 NOTE — TELEPHONE ENCOUNTER
Pt is requesting medication for tooth pain. Claudia [address], [address] [facility].  [facility] [phone] Eloy Homans [phone]   [address] [place] [address]., [address] [place] [id number]   Phone:  [phone]  Fax:  [phone]

## 2022-10-10 ENCOUNTER — HOSPITAL ENCOUNTER (EMERGENCY)
Age: 22
Discharge: HOME | End: 2022-10-10
Attending: EMERGENCY MEDICINE
Payer: MEDICAID

## 2022-10-10 VITALS
DIASTOLIC BLOOD PRESSURE: 79 MMHG | HEIGHT: 67 IN | OXYGEN SATURATION: 100 % | HEART RATE: 84 BPM | RESPIRATION RATE: 14 BRPM | TEMPERATURE: 97.3 F | SYSTOLIC BLOOD PRESSURE: 102 MMHG | BODY MASS INDEX: 23.54 KG/M2 | WEIGHT: 150 LBS

## 2022-10-10 DIAGNOSIS — J02.9 SORE THROAT: Primary | ICD-10-CM

## 2022-10-10 DIAGNOSIS — R09.82 POST-NASAL DRIP: ICD-10-CM

## 2022-10-10 LAB
HCG, URINE, POC: NEGATIVE
INFLUENZA A BY PCR: NOT DETECTED
INFLUENZA B BY PCR: NOT DETECTED
Lab: NORMAL
NEGATIVE QC PASS/FAIL: NORMAL
POSITIVE QC PASS/FAIL: NORMAL
SARS-COV-2, NAAT: NOT DETECTED
STREP GRP A PCR: NEGATIVE

## 2022-10-10 PROCEDURE — 87880 STREP A ASSAY W/OPTIC: CPT

## 2022-10-10 PROCEDURE — 87502 INFLUENZA DNA AMP PROBE: CPT

## 2022-10-10 PROCEDURE — 87635 SARS-COV-2 COVID-19 AMP PRB: CPT

## 2022-10-10 PROCEDURE — 99283 EMERGENCY DEPT VISIT LOW MDM: CPT

## 2022-10-10 RX ORDER — METHYLPREDNISOLONE 4 MG/1
TABLET ORAL
Qty: 1 KIT | Refills: 0 | Status: SHIPPED | OUTPATIENT
Start: 2022-10-10 | End: 2022-10-16

## 2022-10-10 ASSESSMENT — ENCOUNTER SYMPTOMS
RHINORRHEA: 0
VOMITING: 0
COUGH: 0
SHORTNESS OF BREATH: 0
ABDOMINAL PAIN: 0
NAUSEA: 0
DIARRHEA: 0
SORE THROAT: 1

## 2022-10-10 NOTE — ED PROVIDER NOTES
[facility]  ED Provider Note  Department of Emergency Medicine     ED Room: 34      Written by: Brenda Cook DO  Patient Name: Letitia Cooney  Attending Provider: sAhwin Pike DO  Admit Date: 10/10/2022  7:30 AM  MRN: [id number]    : 2000        Chief Complaint   Patient presents with    Pharyngitis     Since waking this am    - Chief complaint    HPI   Letitia Cooney is a 23 y.o. female presenting to the ED for evaluation of Pharyngitis (Since waking this am)      Patient is a 24-year-old female presenting to the ED for evaluation of sore throat since waking up this morning; patient states that she has a history of chronic sinusitis diagnosed when she lived in [facility] (moved to [place] in ); states for this reason she often breathes through her mouth and does sometimes get postnasal drip, states that she was getting some of that last night and thought maybe would improve when she got up this morning but when she woke up this morning she had more of a sore throat. States that it is worsened with swallowing. Denies any change in voice, no difficulty breathing, no shortness of breath, no numbness or tingling anywhere, no neck pain or stiffness. Patient states the reason she came to the ER is because when she was 13years old she had an episode where she had swelling in her right neck with pus, she is unsure if it had to be drained does not really remember much of the episode back when she was living in [facility] but does state that there was an infection there. She does not really feel like this currently but having a sore throat did worry her because of that history. Complaints are mild in severity, no specific alleviating factors, aggravated with swallowing, and began developing gradually since yesterday evening. Illnesses, fevers, cough, vomiting or diarrhea, abdominal pain, or abnormal urinary symptoms.     Review of Systems Constitutional:  Negative for chills and fever. HENT:  Positive for congestion (chronic, states hx chronic sinusitis), postnasal drip and sore throat. Negative for rhinorrhea. Eyes:  Negative for visual disturbance. Respiratory:  Negative for cough and shortness of breath. Cardiovascular:  Negative for chest pain and palpitations. Gastrointestinal:  Negative for abdominal pain, diarrhea, nausea and vomiting. Genitourinary:  Negative for dysuria and frequency. Musculoskeletal:  Negative for myalgias, neck pain and neck stiffness. Skin:  Negative for rash and wound. Neurological:  Negative for weakness, numbness and headaches. Psychiatric/Behavioral:  Negative for confusion. All other systems reviewed and are negative. Physical Exam  Vitals and nursing note reviewed. Constitutional:       General: She is not in acute distress. Appearance: She is not toxic-appearing. HENT:      Head: Normocephalic and atraumatic. Right Ear: Tympanic membrane and external ear normal.      Left Ear: Tympanic membrane and external ear normal.      Nose: Congestion present. No rhinorrhea. Mouth/Throat:      Mouth: Mucous membranes are moist. No oral lesions. Pharynx: Oropharynx is clear. Uvula midline. No pharyngeal swelling, oropharyngeal exudate, posterior oropharyngeal erythema or uvula swelling. Tonsils: No tonsillar exudate or tonsillar abscesses. Eyes:      Extraocular Movements: Extraocular movements intact. Conjunctiva/sclera: Conjunctivae normal.      Pupils: Pupils are equal, round, and reactive to light. Cardiovascular:      Rate and Rhythm: Normal rate and regular rhythm. Pulses: Normal pulses. Heart sounds: Normal heart sounds. Pulmonary:      Effort: Pulmonary effort is normal. No respiratory distress. Breath sounds: Normal breath sounds. No wheezing or rales.    Abdominal:      General: Bowel sounds are normal.      Palpations: Abdomen is soft.      Tenderness: There is no abdominal tenderness. There is no guarding. Musculoskeletal:         General: Normal range of motion. Cervical back: Normal range of motion and neck supple. Right lower leg: No edema. Left lower leg: No edema. Skin:     General: Skin is warm and dry. Capillary Refill: Capillary refill takes less than 2 seconds. Coloration: Skin is not jaundiced or pale. Findings: No erythema or rash. Neurological:      General: No focal deficit present. Mental Status: She is alert and oriented to person, place, and time. Psychiatric:         Mood and Affect: Mood normal.         Behavior: Behavior normal.        Procedures       MDM              Medical Decision Making: This is a 23 y.o. female presenting for evaluation of sore throat since yesterday evening, with post-nasal drip; exam as noted above. Patient alert and oriented on arrival, no acute distress; vital stable. Patient nontoxic in appearance. No meningeal signs. No swelling or redness of oropharynx is noted; no stridor, no exudates, no neck swelling redness or warmth is noted. Patient has no trismus, is handling secretions, normal phonation. Lungs CTA bilaterally. Labs reviewed, rapid flu, COVID, and strep are negative. Urine pregnancy is negative. Feel patient is stable and appropriate for discharge; she remains nontoxic in appearance. We will provide her with a prescription for Medrol Dosepak. Strict return precautions were discussed. Results and plan discussed, patient is amenable to this plan. See ED COURSE for additional MDM. Labs & imaging were reviewed and interpreted, see RESULTS. I have personally reviewed all laboratory and imaging results for this patient. I have discussed this patient with my attending, who has seen the patient and agrees with this disposition. Patient was seen and evaluated by myself and my attending Geovanna Teixeira DO.  Assessment and Plan discussed with attending provider, please see attestation for final plan of care.         --------------------------------------------- PAST HISTORY ---------------------------------------------  Past Medical History:  has no past medical history on file. Past Surgical History:  has no past surgical history on file. Social History:  reports that she has never smoked. She has never used smokeless tobacco. She reports that she does not drink alcohol and does not use drugs. Family History: family history includes Cancer in her paternal grandfather and paternal grandmother; Diabetes in her paternal grandmother; Hypertension in her paternal grandmother. Unless otherwise noted, family history is non contributory. The patients home medications have been reviewed. Allergies: Patient has no known allergies.     -------------------------------------------------- RESULTS -------------------------------------------------  Labs:  Results for orders placed or performed during the hospital encounter of 10/10/22   COVID-19, Rapid    Specimen: Nasopharyngeal Swab   Result Value Ref Range    SARS-CoV-2, NAAT Not Detected Not Detected   Rapid influenza A/B antigens    Specimen: Nasopharyngeal   Result Value Ref Range    Influenza A by PCR Not Detected Not Detected    Influenza B by PCR Not Detected Not Detected   Strep Screen Group A Throat    Specimen: Throat   Result Value Ref Range    Strep Grp A PCR Negative Negative   POC Pregnancy Urine   Result Value Ref Range    HCG, Urine, POC Negative Negative    Lot Number [id number]     Positive QC Pass/Fail Pass     Negative QC Pass/Fail Pass        Radiology:  No orders to display       Interpreted by the radiologist unless otherwise specified.      ------------------------- NURSING NOTES AND VITALS REVIEWED ---------------------------  Date / Time Roomed:  10/10/2022  7:30 AM  ED Bed Assignment:  01/15    The nursing notes within the ED encounter and vital signs as below have been reviewed by myself. /79   Pulse 84   Temp 97.3 °F (36.3 °C) (Temporal)   Resp 14   Ht 5' 7\" (1.702 m)   Wt 150 lb (68 kg)   SpO2 100%   BMI 23.49 kg/m²   Oxygen Saturation Interpretation: Normal    The patients available past medical records and past encounters were reviewed. ------------------------------------------ PROGRESS NOTES ------------------------------------------  9:48 AM EDT  I have spoken with the patient and discussed todays results, in addition to providing specific details for the plan of care and counseling regarding the diagnosis and prognosis. Their questions are answered at this time and they are agreeable with the plan. I discussed at length with them reasons for immediate return here for re evaluation. They will followup with their primary care physician by calling their office tomorrow. --------------------------------- ADDITIONAL PROVIDER NOTES ---------------------------------  At this time the patient is without objective evidence of an acute process requiring hospitalization or inpatient management. They have remained hemodynamically stable throughout their entire ED visit and are stable for discharge with outpatient follow-up. The plan has been discussed in detail and they are aware of the specific conditions for emergent return, as well as the importance of follow-up. New Prescriptions    METHYLPREDNISOLONE (MEDROL, BETI,) 4 MG TABLET    Take by mouth as instructed on kit. Diagnosis:  1. Sore throat    2. Post-nasal drip        Disposition:  Patient's disposition: Discharge to home  Patient's condition is stable. Felicia Weller D.O. PGY-3     Resident Physician     Emergency Medicine      10/10/2022 7:43 AM      NOTE: This report was transcribed using voice recognition software.  Every effort was made to ensure accuracy; however, inadvertent computerized transcription errors may be present             [facility], DO  Resident  10/10/22 [id number]

## 2022-10-10 NOTE — Clinical Note
Chang Porter was seen and treated in our emergency department on 10/10/2022. She may return to work on 10/12/2022. If you have any questions or concerns, please don't hesitate to call.       Oksana Frey, DO

## 2022-10-10 NOTE — Clinical Note
Chelly Campoverde was seen and treated in our emergency department on 10/10/2022. She may return to work on 10/12/2022. If you have any questions or concerns, please don't hesitate to call.       Trista Pena, DO

## 2023-02-10 ENCOUNTER — APPOINTMENT (OUTPATIENT)
Dept: ULTRASOUND IMAGING | Age: 23
End: 2023-02-10
Payer: MEDICAID

## 2023-02-10 ENCOUNTER — HOSPITAL ENCOUNTER (EMERGENCY)
Age: 23
Discharge: HOME | End: 2023-02-10
Attending: EMERGENCY MEDICINE
Payer: MEDICAID

## 2023-02-10 VITALS
DIASTOLIC BLOOD PRESSURE: 72 MMHG | BODY MASS INDEX: 22.88 KG/M2 | OXYGEN SATURATION: 97 % | SYSTOLIC BLOOD PRESSURE: 105 MMHG | WEIGHT: 151 LBS | RESPIRATION RATE: 16 BRPM | HEART RATE: 99 BPM | TEMPERATURE: 97.2 F | HEIGHT: 68 IN

## 2023-02-10 DIAGNOSIS — R11.2 NAUSEA AND VOMITING, UNSPECIFIED VOMITING TYPE: ICD-10-CM

## 2023-02-10 DIAGNOSIS — N30.00 ACUTE CYSTITIS WITHOUT HEMATURIA: Primary | ICD-10-CM

## 2023-02-10 DIAGNOSIS — E86.0 DEHYDRATION: ICD-10-CM

## 2023-02-10 LAB
ALBUMIN SERPL-MCNC: 4.3 G/DL (ref 3.5–5.2)
ALP BLD-CCNC: 55 U/L (ref 35–104)
ALT SERPL-CCNC: 11 U/L (ref 0–32)
ANION GAP SERPL CALCULATED.3IONS-SCNC: 10 MMOL/L (ref 7–16)
AST SERPL-CCNC: 30 U/L (ref 0–31)
BACTERIA: ABNORMAL /HPF
BASOPHILS ABSOLUTE: 0.01 E9/L (ref 0–0.2)
BASOPHILS RELATIVE PERCENT: 0.2 % (ref 0–2)
BILIRUB SERPL-MCNC: 1.5 MG/DL (ref 0–1.2)
BILIRUBIN URINE: ABNORMAL
BLOOD, URINE: NEGATIVE
BUN BLDV-MCNC: 11 MG/DL (ref 6–20)
CALCIUM SERPL-MCNC: 9.2 MG/DL (ref 8.6–10.2)
CHLORIDE BLD-SCNC: 101 MMOL/L (ref 98–107)
CLARITY: CLEAR
CO2: 26 MMOL/L (ref 22–29)
COLOR: YELLOW
CREAT SERPL-MCNC: 0.7 MG/DL (ref 0.5–1)
EOSINOPHILS ABSOLUTE: 0.04 E9/L (ref 0.05–0.5)
EOSINOPHILS RELATIVE PERCENT: 0.9 % (ref 0–6)
EPITHELIAL CELLS, UA: ABNORMAL /HPF
GFR SERPL CREATININE-BSD FRML MDRD: >60 ML/MIN/1.73
GLUCOSE BLD-MCNC: 84 MG/DL (ref 74–99)
GLUCOSE URINE: NEGATIVE MG/DL
HCG(URINE) PREGNANCY TEST: NEGATIVE
HCT VFR BLD CALC: 37.9 % (ref 34–48)
HEMOGLOBIN: 11.7 G/DL (ref 11.5–15.5)
IMMATURE GRANULOCYTES #: 0 E9/L
IMMATURE GRANULOCYTES %: 0 % (ref 0–5)
KETONES, URINE: 40 MG/DL
LACTIC ACID: 0.6 MMOL/L (ref 0.5–2.2)
LEUKOCYTE ESTERASE, URINE: ABNORMAL
LIPASE: 13 U/L (ref 13–60)
LYMPHOCYTES ABSOLUTE: 0.58 E9/L (ref 1.5–4)
LYMPHOCYTES RELATIVE PERCENT: 12.8 % (ref 20–42)
MCH RBC QN AUTO: 24.2 PG (ref 26–35)
MCHC RBC AUTO-ENTMCNC: 30.9 % (ref 32–34.5)
MCV RBC AUTO: 78.5 FL (ref 80–99.9)
MONOCYTES ABSOLUTE: 0.5 E9/L (ref 0.1–0.95)
MONOCYTES RELATIVE PERCENT: 11 % (ref 2–12)
MUCUS: PRESENT /LPF
NEUTROPHILS ABSOLUTE: 3.41 E9/L (ref 1.8–7.3)
NEUTROPHILS RELATIVE PERCENT: 75.1 % (ref 43–80)
NITRITE, URINE: NEGATIVE
OVALOCYTES: ABNORMAL
PDW BLD-RTO: 14.8 FL (ref 11.5–15)
PH UA: 5.5 (ref 5–9)
PLATELET # BLD: 230 E9/L (ref 130–450)
PMV BLD AUTO: 10.7 FL (ref 7–12)
POIKILOCYTES: ABNORMAL
POTASSIUM SERPL-SCNC: 4.3 MMOL/L (ref 3.5–5)
PROTEIN UA: NEGATIVE MG/DL
RBC # BLD: 4.83 E12/L (ref 3.5–5.5)
RBC UA: ABNORMAL /HPF (ref 0–2)
SARS-COV-2, NAAT: NOT DETECTED
SODIUM BLD-SCNC: 137 MMOL/L (ref 132–146)
SPECIFIC GRAVITY UA: >=1.03 (ref 1–1.03)
TOTAL PROTEIN: 7.5 G/DL (ref 6.4–8.3)
UROBILINOGEN, URINE: 0.2 E.U./DL
WBC # BLD: 4.5 E9/L (ref 4.5–11.5)
WBC UA: ABNORMAL /HPF (ref 0–5)

## 2023-02-10 PROCEDURE — 76705 ECHO EXAM OF ABDOMEN: CPT

## 2023-02-10 PROCEDURE — 6370000000 HC RX 637 (ALT 250 FOR IP): Performed by: STUDENT IN AN ORGANIZED HEALTH CARE EDUCATION/TRAINING PROGRAM

## 2023-02-10 PROCEDURE — 96375 TX/PRO/DX INJ NEW DRUG ADDON: CPT

## 2023-02-10 PROCEDURE — A4216 STERILE WATER/SALINE, 10 ML: HCPCS | Performed by: STUDENT IN AN ORGANIZED HEALTH CARE EDUCATION/TRAINING PROGRAM

## 2023-02-10 PROCEDURE — 80053 COMPREHEN METABOLIC PANEL: CPT

## 2023-02-10 PROCEDURE — 83690 ASSAY OF LIPASE: CPT

## 2023-02-10 PROCEDURE — 2580000003 HC RX 258: Performed by: STUDENT IN AN ORGANIZED HEALTH CARE EDUCATION/TRAINING PROGRAM

## 2023-02-10 PROCEDURE — 6360000002 HC RX W HCPCS: Performed by: STUDENT IN AN ORGANIZED HEALTH CARE EDUCATION/TRAINING PROGRAM

## 2023-02-10 PROCEDURE — 96374 THER/PROPH/DIAG INJ IV PUSH: CPT

## 2023-02-10 PROCEDURE — 87088 URINE BACTERIA CULTURE: CPT

## 2023-02-10 PROCEDURE — 81025 URINE PREGNANCY TEST: CPT

## 2023-02-10 PROCEDURE — 2500000003 HC RX 250 WO HCPCS: Performed by: STUDENT IN AN ORGANIZED HEALTH CARE EDUCATION/TRAINING PROGRAM

## 2023-02-10 PROCEDURE — 83605 ASSAY OF LACTIC ACID: CPT

## 2023-02-10 PROCEDURE — 85025 COMPLETE CBC W/AUTO DIFF WBC: CPT

## 2023-02-10 PROCEDURE — 81001 URINALYSIS AUTO W/SCOPE: CPT

## 2023-02-10 PROCEDURE — 99284 EMERGENCY DEPT VISIT MOD MDM: CPT

## 2023-02-10 PROCEDURE — 96361 HYDRATE IV INFUSION ADD-ON: CPT

## 2023-02-10 PROCEDURE — 87635 SARS-COV-2 COVID-19 AMP PRB: CPT

## 2023-02-10 RX ORDER — PHENAZOPYRIDINE HYDROCHLORIDE 100 MG/1
100 TABLET, FILM COATED ORAL 3 TIMES DAILY PRN
Qty: 9 TABLET | Refills: 0 | Status: SHIPPED | OUTPATIENT
Start: 2023-02-10 | End: 2023-02-13

## 2023-02-10 RX ORDER — FENTANYL CITRATE 50 UG/ML
25 INJECTION, SOLUTION INTRAMUSCULAR; INTRAVENOUS ONCE
Status: COMPLETED | OUTPATIENT
Start: 2023-02-10 | End: 2023-02-10

## 2023-02-10 RX ORDER — ONDANSETRON 2 MG/ML
4 INJECTION INTRAMUSCULAR; INTRAVENOUS ONCE
Status: COMPLETED | OUTPATIENT
Start: 2023-02-10 | End: 2023-02-10

## 2023-02-10 RX ORDER — FAMOTIDINE 20 MG/1
10 TABLET, FILM COATED ORAL 2 TIMES DAILY
Qty: 7 TABLET | Refills: 0 | Status: SHIPPED | OUTPATIENT
Start: 2023-02-10 | End: 2023-02-17

## 2023-02-10 RX ORDER — 0.9 % SODIUM CHLORIDE 0.9 %
1000 INTRAVENOUS SOLUTION INTRAVENOUS ONCE
Status: COMPLETED | OUTPATIENT
Start: 2023-02-10 | End: 2023-02-10

## 2023-02-10 RX ORDER — NITROFURANTOIN 25; 75 MG/1; MG/1
100 CAPSULE ORAL ONCE
Status: COMPLETED | OUTPATIENT
Start: 2023-02-10 | End: 2023-02-10

## 2023-02-10 RX ORDER — ONDANSETRON 4 MG/1
4 TABLET, ORALLY DISINTEGRATING ORAL EVERY 12 HOURS PRN
Qty: 12 TABLET | Refills: 0 | Status: SHIPPED | OUTPATIENT
Start: 2023-02-10 | End: 2023-02-16

## 2023-02-10 RX ORDER — NITROFURANTOIN 25; 75 MG/1; MG/1
100 CAPSULE ORAL 2 TIMES DAILY
Qty: 20 CAPSULE | Refills: 0 | Status: SHIPPED | OUTPATIENT
Start: 2023-02-10 | End: 2023-02-20

## 2023-02-10 RX ADMIN — NITROFURANTOIN (MONOHYDRATE/MACROCRYSTALS) 100 MG: 75; 25 CAPSULE ORAL at 12:12

## 2023-02-10 RX ADMIN — SODIUM CHLORIDE 1000 ML: 9 INJECTION, SOLUTION INTRAVENOUS at 08:59

## 2023-02-10 RX ADMIN — FENTANYL CITRATE 25 MCG: 50 INJECTION, SOLUTION INTRAMUSCULAR; INTRAVENOUS at 09:01

## 2023-02-10 RX ADMIN — LIDOCAINE HYDROCHLORIDE: 20 SOLUTION ORAL; TOPICAL at 11:31

## 2023-02-10 RX ADMIN — ONDANSETRON 4 MG: 2 INJECTION INTRAMUSCULAR; INTRAVENOUS at 09:01

## 2023-02-10 RX ADMIN — FAMOTIDINE 20 MG: 10 INJECTION, SOLUTION INTRAVENOUS at 09:00

## 2023-02-10 ASSESSMENT — PAIN SCALES - GENERAL
PAINLEVEL_OUTOF10: 10
PAINLEVEL_OUTOF10: 10

## 2023-02-10 ASSESSMENT — LIFESTYLE VARIABLES
HOW MANY STANDARD DRINKS CONTAINING ALCOHOL DO YOU HAVE ON A TYPICAL DAY: PATIENT DOES NOT DRINK
HOW OFTEN DO YOU HAVE A DRINK CONTAINING ALCOHOL: NEVER

## 2023-02-10 ASSESSMENT — PAIN DESCRIPTION - ORIENTATION: ORIENTATION: MID

## 2023-02-10 ASSESSMENT — PAIN DESCRIPTION - DESCRIPTORS
DESCRIPTORS: BURNING
DESCRIPTORS: BURNING

## 2023-02-10 ASSESSMENT — PAIN DESCRIPTION - LOCATION
LOCATION: ABDOMEN
LOCATION: ABDOMEN

## 2023-02-10 NOTE — ED PROVIDER NOTES
ATTENDING PROVIDER ATTESTATION:     Jojo Abrams presented to the emergency department for evaluation of Emesis (X3 since yesterday ) and Diarrhea (Since yesterday )   and was initially evaluated by the Medical Resident. See Original ED Note for H&P and ED course above. I have reviewed and discussed the case, including pertinent history (medical, surgical, family and social) and exam findings with the Medical Resident assigned to Jojo Abrams. I have personally performed and/or participated in the history, exam, medical decision making, and procedures and agree with all pertinent clinical information and any additional changes or corrections are noted below in my assessment and plan. I have discussed this patient in detail with the resident, and provided the instruction and education,       I have reviewed my findings and recommendations with the assigned Medical Resident, Jojo Abrams and members of family present at the time of disposition. I have performed a history and physical examination of this patient and directly supervised the resident caring for this patient              [address]        Pt Name: Jojo Abrams  MRN: [id number]  [place] 2000  Date of evaluation: 2/10/2023  Provider: Igor Smith MD  PCP: Suly Rosa DO  Note Started: 8:22 AM EST 2/10/23    CHIEF COMPLAINT       Chief Complaint   Patient presents with    Emesis     X3 since yesterday     Diarrhea     Since yesterday        HISTORY OF PRESENT ILLNESS: 1 or more Elements     Limitations to history : None    Jojo Abrams is a 20 y.o. female who presents for nausea, vomiting, diarrhea, abdominal pain. Patient reports that since yesterday she has had nonbloody, nonbilious emesis as well as nonbloody diarrhea. She reports upper abdominal pain as well.   No fevers or chills. No sick contacts. No trauma. No pelvic pain. No urinary symptoms. No lower abdominal pain. She denies any other complaints. Nursing Notes were all reviewed and agreed with or any disagreements were addressed in the HPI. REVIEW OF EXTERNAL NOTE :       1/31/23 OBGYN visit    Controlled Substance Monitoring:    Acute and Chronic Pain Monitoring:   No flowsheet data found. REVIEW OF SYSTEMS :      Positives and Pertinent negatives as per HPI. SURGICAL HISTORY   No past surgical history on file. [address]       Discharge Medication List as of 2/10/2023 12:26 PM        CONTINUE these medications which have NOT CHANGED    Details   levonorgestrel-ethinyl estradiol (AVIANE;ALESSE;LESSINA) 0.1-20 MG-MCG per tablet Take 1 tablet by mouth daily, Disp-1 packet, R-11Normal      naproxen (NAPROSYN) 375 MG tablet Take 1 tablet by mouth 2 times daily (with meals), Disp-60 tablet, R-0Print             ALLERGIES     Patient has no known allergies.     FAMILYHISTORY       Family History   Problem Relation Age of Onset    Diabetes Paternal Grandmother     Hypertension Paternal Grandmother     Cancer Paternal Grandmother     Cancer Paternal Grandfather         SOCIAL HISTORY       Social History     Tobacco Use    Smoking status: Never    Smokeless tobacco: Never   Substance Use Topics    Alcohol use: Never    Drug use: Never       SCREENINGS        [place] Coma Scale  Eye Opening: Spontaneous  Best Verbal Response: Oriented  Best Motor Response: Obeys commands  [place] Coma Scale Score: 15                CIWA Assessment  BP: 105/72  Heart Rate: 99           PHYSICAL EXAM  1 or more Elements     ED Triage Vitals [02/10/23 0748]   BP Temp Temp src Heart Rate Resp SpO2 Height Weight   105/72 97.2 °F (36.2 °C) -- 99 16 97 % 5' 8\" (1.727 m) 151 lb (68.5 kg)                Constitutional/General: Alert and oriented x3  Head: Normocephalic and atraumatic  Eyes: PERRL, EOMI, conjunctiva normal, sclera non icteric  ENT:  Oropharynx clear, handling secretions, no trismus, no asymmetry of the posterior oropharynx or uvular edema  Neck: Supple, full ROM, no stridor, no meningeal signs  Respiratory: Lungs clear to auscultation bilaterally, no wheezes, rales, or rhonchi. Not in respiratory distress  Cardiovascular:  Regular rate. Regular rhythm. No murmurs, no gallops, no rubs. 2+ distal pulses. Equal extremity pulses. GI:  Abdomen Soft, very mild epigastric abdominal tenderness. No lower abdominal tenderness. No tenderness to McBurney's point. Negative Neves sign. No other abdominal tenderness. , Non distended. No rebound, guarding, or rigidity. No pulsatile masses. Musculoskeletal: Moves all extremities x 4. Warm and well perfused, no clubbing, no cyanosis, no edema. Capillary refill <3 seconds  Integument: skin warm and dry. No rashes. Neurologic: GCS 15, no focal deficits,            DIAGNOSTIC RESULTS   LABS: Interpreted by Simone Oconnor MD    Labs Reviewed   CBC WITH AUTO DIFFERENTIAL - Abnormal; Notable for the following components:       Result Value    MCV 78.5 (*)     MCH 24.2 (*)     MCHC 30.9 (*)     Lymphocytes % 12.8 (*)     Lymphocytes Absolute 0.58 (*)     Eosinophils Absolute 0.04 (*)     All other components within normal limits   COMPREHENSIVE METABOLIC PANEL - Abnormal; Notable for the following components:     Total Bilirubin 1.5 (*)     All other components within normal limits   URINALYSIS WITH MICROSCOPIC - Abnormal; Notable for the following components:    Bilirubin Urine SMALL (*)     Ketones, Urine 40 (*)     Leukocyte Esterase, Urine TRACE (*)     Mucus, UA Present (*)     Bacteria, UA MODERATE (*)     All other components within normal limits   COVID-19, RAPID   CULTURE, URINE   LACTIC ACID   LIPASE   PREGNANCY, URINE       As interpreted by me, the above displayed labs are abnormal. All other labs obtained during this visit were within normal range or not returned as of this dictation. RADIOLOGY:   Unless a wet read is documented in MDM or ED course,  non-plain film images such as CT, Ultrasound and MRI are read by the radiologist. Plain radiographic images are visualized and preliminarily interpreted by the ED Provider with the findings documented in the ED course:    Interpretation per the Radiologist below, if available at the time of this note:    [address]   Final Result   Unremarkable right upper quadrant ultrasound. No results found. No results found. PROCEDURES   Unless otherwise noted below, none       PAST MEDICAL HISTORY/Chronic Conditions Affecting Care      has no past medical history on file.      EMERGENCY DEPARTMENT COURSE    Vitals:    Vitals:    02/10/23 0748 02/10/23 0808   BP: 105/72    Pulse: 99    Resp: 16    Temp: 97.2 °F (36.2 °C)    SpO2: 97%    Weight: 151 lb (68.5 kg) 151 lb (68.5 kg)   Height: 5' 8\" (1.727 m) 5' 8\" (1.727 m)       Patient was given the following medications:  Medications   0.9 % sodium chloride bolus (0 mLs IntraVENous Stopped 2/10/23 1118)   ondansetron (ZOFRAN) injection 4 mg (4 mg IntraVENous Given 2/10/23 0901)   fentaNYL (SUBLIMAZE) injection 25 mcg (25 mcg IntraVENous Given 2/10/23 0901)   famotidine (PEPCID) 20 mg in sodium chloride (PF) 0.9 % 10 mL injection (20 mg IntraVENous Given 2/10/23 0900)   aluminum & magnesium hydroxide-simethicone (MAALOX) 30 mL, lidocaine viscous hcl (XYLOCAINE) 5 mL (GI COCKTAIL) ( Oral Given 2/10/23 1131)   nitrofurantoin (macrocrystal-monohydrate) (MACROBID) capsule 100 mg (100 mg Oral Given 2/10/23 1212)              Medical Decision Making/Differential Diagnosis:    CC/HPI Summary, Social Determinants of health, Records Reviewed, DDx, testing done/not done, ED Course, Reassessment, disposition considerations/shared decision making with patient, consults, disposition:      ED Course as of 02/10/23 2042   Fri Feb 10, 2023   1006 My independent interpretation of labs showed normal COVID, normal lipase, normal CMP, CBC normal [CD]   1105 US GALLBLADDER RUQ  IMPRESSION:  Unremarkable right upper quadrant ultrasound. [PW]   1113 Ketones, Urine(!): 40  Some dehydration present, IV fluids were given. [PW]   2509 HCG(Urine) Pregnancy Test: NEGATIVE [PW]   6500 Bacteria, UA(!): MODERATE [PW]   1200 Consulted with pharmacist who recommended Macrobid for urinary tract infection. [PW]   1201 The patient by results. She states she is feeling better at this time. [PW]      ED Course User Index  [CD] Kai Puente MD  [PW] Blas Hernandez,           Medical Decision Making  Differential includes but not limited to gastroenteritis, gastritis, pancreatitis, cholecystitis to name a few. She has no lower abdominal pain and no signs of appendicitis. She has no pelvic pain or urinary symptoms. Nothing to suggest appendicitis or cholecystitis. I independently interpreted the [address] and there were no gallstones. Labs reassuring. Sx improving. No vomiting here. Considered admission but she improved and testing was reassuring. Will recommend supportive care    Problems Addressed:  Acute cystitis without hematuria: acute illness or injury  Dehydration: acute illness or injury  Nausea and vomiting, unspecified vomiting type: acute illness or injury    Amount and/or Complexity of Data Reviewed  Labs: ordered. Decision-making details documented in ED Course. Radiology: ordered. Decision-making details documented in ED Course. Risk  Prescription drug management. CONSULTS:   None              CRITICAL CARE TIME (.cct)           I am the Primary Clinician of Record. FINAL IMPRESSION      1. Acute cystitis without hematuria    2. Nausea and vomiting, unspecified vomiting type    3.  Dehydration          DISPOSITION/PLAN     DISPOSITION Decision To Discharge 02/10/2023 12:15:52 PM      PATIENT REFERRED TO:  [facility] [place] [phone]    Call in 3 days  For follow up    DISCHARGE MEDICATIONS:  Discharge Medication List as of 2/10/2023 12:26 PM        START taking these medications    Details   nitrofurantoin, macrocrystal-monohydrate, (MACROBID) 100 MG capsule Take 1 capsule by mouth 2 times daily for 10 days, Disp-20 capsule, R-0Print      ondansetron (ZOFRAN-ODT) 4 MG disintegrating tablet Take 1 tablet by mouth every 12 hours as needed for Nausea or Vomiting, Disp-12 tablet, R-0Print      phenazopyridine (PYRIDIUM) 100 MG tablet Take 1 tablet by mouth 3 times daily as needed for Pain, Disp-9 tablet, R-0Print      famotidine (PEPCID) 20 MG tablet Take 0.5 tablets by mouth 2 times daily for 7 days, Disp-7 tablet, R-0Print             DISCONTINUED MEDICATIONS:  Discharge Medication List as of 2/10/2023 12:26 PM                 (Please note that portions of this note were completed with a voice recognition program.  Efforts were made to edit the dictations but occasionally words are mis-transcribed.)    Delmy Meek MD (electronically signed)            Ita Gaviria MD  02/10/23 2047

## 2023-02-10 NOTE — ED PROVIDER NOTES
Name: Madiha Ortez    MRN: [id number]     Date / Time Roomed:  2/10/2023  8:00 AM  ED Bed Assignment:  25/25    ------------------ History of Present Illness --------------------  2/10/23, Time: 8:37 AM EST   Chief Complaint   Patient presents with    Emesis     X3 since yesterday     Diarrhea     Since yesterday       HPI    Madiha Ortez is a 20 y.o. female, with no previous med history, who presents to the ED today for epigastric pain as well as nausea and vomiting and diarrhea which started yesterday evening. Patient relates she has had 2 episodes of vomiting as well as 2 episodes of diarrhea since yesterday evening and some intermittent burning epigastric pain, which she relates 10 out of 10. Patiently she has had some increased urinary frequency as well. Denies any changes in dietary habits. Denies any association of her pain with eating. Denies any vaginal discharge, bleeding or concern for STI. Denies any back pain, chest pain, shortness of breath or fevers. Denies any known sick contacts. Denies any blood in the vomit or stool. Denies any urinary discomfort. The pt denies other ROS at this time. PCP: Evi Monday, DO.    -------------------- PMH --------------------  Past Medical History:  has no past medical history on file. Past Surgical History:  has no past surgical history on file. Social History:  reports that she has never smoked. She has never used smokeless tobacco. She reports that she does not drink alcohol and does not use drugs. Family History: family history includes Cancer in her paternal grandfather and paternal grandmother; Diabetes in her paternal grandmother; Hypertension in her paternal grandmother. Allergies: Patient has no known allergies. The patients past medical history has been reviewed.     -------------------- Current Meds --------------------  Meds: No current facility-administered medications for this encounter. Current Outpatient Medications:     nitrofurantoin, macrocrystal-monohydrate, (MACROBID) 100 MG capsule, Take 1 capsule by mouth 2 times daily for 10 days, Disp: 20 capsule, Rfl: 0    ondansetron (ZOFRAN-ODT) 4 MG disintegrating tablet, Take 1 tablet by mouth every 12 hours as needed for Nausea or Vomiting, Disp: 12 tablet, Rfl: 0    phenazopyridine (PYRIDIUM) 100 MG tablet, Take 1 tablet by mouth 3 times daily as needed for Pain, Disp: 9 tablet, Rfl: 0    famotidine (PEPCID) 20 MG tablet, Take 0.5 tablets by mouth 2 times daily for 7 days, Disp: 7 tablet, Rfl: 0    levonorgestrel-ethinyl estradiol (AVIANE;ALESSE;LESSINA) 0.1-20 MG-MCG per tablet, Take 1 tablet by mouth daily, Disp: 1 packet, Rfl: 11    naproxen (NAPROSYN) 375 MG tablet, Take 1 tablet by mouth 2 times daily (with meals), Disp: 60 tablet, Rfl: 0     The patients home medications have been reviewed. -------------------- PE --------------------  Physical Exam  Constitutional:       General: She is not in acute distress. Appearance: Normal appearance. She is normal weight. She is not ill-appearing, toxic-appearing or diaphoretic. HENT:      Head: Normocephalic and atraumatic. Right Ear: External ear normal.      Left Ear: External ear normal.      Nose: Nose normal. No rhinorrhea. Mouth/Throat:      Mouth: Mucous membranes are moist.      Pharynx: Oropharynx is clear. No oropharyngeal exudate or posterior oropharyngeal erythema. Eyes:      General: No scleral icterus. Right eye: No discharge. Left eye: No discharge. Extraocular Movements: Extraocular movements intact. Conjunctiva/sclera: Conjunctivae normal.      Pupils: Pupils are equal, round, and reactive to light. Cardiovascular:      Rate and Rhythm: Normal rate and regular rhythm. Pulses: Normal pulses. Pulmonary:      Effort: Pulmonary effort is normal. No respiratory distress. Breath sounds: Normal breath sounds.  No stridor. Abdominal:      General: Abdomen is flat. There is no distension. Palpations: Abdomen is soft. Tenderness: There is abdominal tenderness (Right upper quadrant epigastric region, mild). There is no right CVA tenderness, left CVA tenderness or guarding. Musculoskeletal:         General: No swelling or tenderness. Normal range of motion. Cervical back: Normal range of motion. Right lower leg: No edema. Left lower leg: No edema. Skin:     General: Skin is warm and dry. Capillary Refill: Capillary refill takes less than 2 seconds. Coloration: Skin is not jaundiced. Findings: No erythema or rash. Neurological:      General: No focal deficit present. Mental Status: She is alert and oriented to person, place, and time. Psychiatric:         Mood and Affect: Mood normal.         Behavior: Behavior normal.        --------------- External Imaging -------------    -------------------- Procedures --------------------    -------------------- MDM --------------------    Pt presented to ED today for epigastric pain as well as nausea vomiting diarrhea x1 day. Pt alert, oriented, no distress. Vitals stable. /72   Pulse 99   Temp 97.2 °F (36.2 °C)   Resp 16   Ht 5' 8\" (1.727 m)   Wt 151 lb (68.5 kg)   SpO2 97%   BMI 22.96 kg/m²     Exam remarkable for overall well appearing female, pleasant, no distress. Abd non distended, mildly tender in epigastric and RUQ region, no guarding, negative mcburneys. No cva tenderness. Oropharynx clear, nonerythematous, no exudate. Diagnoses considered, but not limited to, include gastritis, gallbladder pathology, gastroenteritis, UTI, metabolic derangements. No RLQ or periumbilical tenderness appreciated. Appendicitis felt unlikely. Labwork ordered and interpretation by myself. Details below. Imaging ordered and interpretations by myself and radiologist. Details below.     ED Course as of 02/10/23 2218   Fri Feb 10, 2023   1006 My independent interpretation of labs showed normal COVID, normal lipase, normal CMP, CBC normal [CD]   1105 US GALLBLADDER RUQ  IMPRESSION:  Unremarkable right upper quadrant ultrasound. [PW]   1113 Ketones, Urine(!): 40  Some dehydration present, IV fluids were given. [PW]   9221 HCG(Urine) Pregnancy Test: NEGATIVE [PW]   9526 Bacteria, UA(!): MODERATE [PW]   1200 Consulted with pharmacist who recommended Macrobid for urinary tract infection. [PW]   1201 The patient by results. She states she is feeling better at this time. [PW]      ED Course User Index  [CD] Aliyah Callaway MD  [PW] Anita Lanes, DO          Medications given include:    Medications   0.9 % sodium chloride bolus (0 mLs IntraVENous Stopped 2/10/23 1118)   ondansetron (ZOFRAN) injection 4 mg (4 mg IntraVENous Given 2/10/23 0901)   fentaNYL (SUBLIMAZE) injection 25 mcg (25 mcg IntraVENous Given 2/10/23 0901)   famotidine (PEPCID) 20 mg in sodium chloride (PF) 0.9 % 10 mL injection (20 mg IntraVENous Given 2/10/23 0900)   aluminum & magnesium hydroxide-simethicone (MAALOX) 30 mL, lidocaine viscous hcl (XYLOCAINE) 5 mL (GI COCKTAIL) ( Oral Given 2/10/23 1131)   nitrofurantoin (macrocrystal-monohydrate) (MACROBID) capsule 100 mg (100 mg Oral Given 2/10/23 1212)       IV fluids, zofran, pepcid and fentanyl given. GI cocktail was given as well. RUQ US was negative and gall bladder pathology felt unlikely. Workup remarkable for evidence of mild dehydration as well as UTI, as pt had increased urinary frequency. Urine cx pending. No leukocytosis, no cva tenderness, pyelo felt unlikely. She did relate n/v/d which may possibly be due to viral gastroenteritis as well. Pharmacy consulted and [facility] [place] was given. Advanced imaging was considered however felt unnecessary as workup was reassuring. Pt overall well appearing on re-eval, pleasant and no distress. Pt's symptoms were much improved after treatment and Vitals remained stable. Further workup felt not indicated at this time. Spoke with pt about results including any incidental findings. Recommended f/u with PCP on their results, including any incidental findings . Return precautions were given. Pt amenable to plan and was d/c'd home.    New prescription(s):, Nitrofurantoin, Pepcid, Pyridium, Zofran      -------------------- Consultations --------------------      -------------------- RESULTS --------------------    Labs:  Results for orders placed or performed during the hospital encounter of 02/10/23   COVID-19, Rapid    Specimen: Nasopharyngeal Swab   Result Value Ref Range    SARS-CoV-2, NAAT Not Detected Not Detected   CBC with Auto Differential   Result Value Ref Range    WBC 4.5 4.5 - 11.5 E9/L    RBC 4.83 3.50 - 5.50 E12/L    Hemoglobin 11.7 11.5 - 15.5 g/dL    Hematocrit 37.9 34.0 - 48.0 %    MCV 78.5 (L) 80.0 - 99.9 fL    MCH 24.2 (L) 26.0 - 35.0 pg    MCHC 30.9 (L) 32.0 - 34.5 %    RDW 14.8 11.5 - 15.0 fL    Platelets 618 409 - 499 E9/L    MPV 10.7 7.0 - 12.0 fL    Neutrophils % 75.1 43.0 - 80.0 %    Immature Granulocytes % 0.0 0.0 - 5.0 %    Lymphocytes % 12.8 (L) 20.0 - 42.0 %    Monocytes % 11.0 2.0 - 12.0 %    Eosinophils % 0.9 0.0 - 6.0 %    Basophils % 0.2 0.0 - 2.0 %    Neutrophils Absolute 3.41 1.80 - 7.30 E9/L    Immature Granulocytes # 0.00 E9/L    Lymphocytes Absolute 0.58 (L) 1.50 - 4.00 E9/L    Monocytes Absolute 0.50 0.10 - 0.95 E9/L    Eosinophils Absolute 0.04 (L) 0.05 - 0.50 E9/L    Basophils Absolute 0.01 0.00 - 0.20 E9/L    Poikilocytes 1+     Ovalocytes 1+    CMP   Result Value Ref Range    Sodium 137 132 - 146 mmol/L    Potassium 4.3 3.5 - 5.0 mmol/L    Chloride 101 98 - 107 mmol/L    CO2 26 22 - 29 mmol/L    Anion Gap 10 7 - 16 mmol/L    Glucose 84 74 - 99 mg/dL    BUN 11 6 - 20 mg/dL    Creatinine 0.7 0.5 - 1.0 mg/dL    Est, Glom Filt Rate >60 >=60 mL/min/1.73    Calcium 9.2 8.6 - 10.2 mg/dL    Total Protein 7.5 6.4 - 8.3 g/dL    Albumin 4.3 3.5 - 5.2 g/dL    Total Bilirubin 1.5 (H) 0.0 - 1.2 mg/dL    Alkaline Phosphatase 55 35 - 104 U/L    ALT 11 0 - 32 U/L    AST 30 0 - 31 U/L   Lactic Acid   Result Value Ref Range    Lactic Acid 0.6 0.5 - 2.2 mmol/L   Lipase   Result Value Ref Range    Lipase 13 13 - 60 U/L   Urinalysis with Microscopic   Result Value Ref Range    Color, UA Yellow Straw/Yellow    Clarity, UA Clear Clear    Glucose, Ur Negative Negative mg/dL    Bilirubin Urine SMALL (A) Negative    Ketones, Urine 40 (A) Negative mg/dL    Specific Gravity, UA >=1.030 1.005 - 1.030    Blood, Urine Negative Negative    pH, UA 5.5 5.0 - 9.0    Protein, UA Negative Negative mg/dL    Urobilinogen, Urine 0.2 <2.0 E.U./dL    Nitrite, Urine Negative Negative    Leukocyte Esterase, Urine TRACE (A) Negative    Mucus, UA Present (A) None Seen /LPF    WBC, UA 2-5 0 - 5 /HPF    RBC, UA 2-5 0 - 2 /HPF    Epithelial Cells, UA FEW /HPF    Bacteria, UA MODERATE (A) None Seen /HPF   Pregnancy, urine   Result Value Ref Range    HCG(Urine) Pregnancy Test NEGATIVE NEGATIVE       Radiology:  US GALLBLADDER RUQ   Final Result   Unremarkable right upper quadrant ultrasound.             -------------------- NURSING NOTES & VITALS --------------------    The nursing notes within the ED encounter and vital signs were reviewed. Vitals:    02/10/23 0748   BP: 105/72   Pulse: 99   Resp: 16   Temp: 97.2 °F (36.2 °C)   SpO2: 97%        No data found. Oxygen Saturation Interpretation: Normal      -------------------- ADDITIONAL PROVIDER NOTES --------------------  At this time the patient is without objective evidence of an acute process requiring hospitalization or inpatient management. They have remained hemodynamically stable throughout their entire ED visit and are stable for discharge with outpatient follow-up. The plan has been discussed in detail and they are aware of the specific conditions for emergent return, as well as the importance of follow-up.       Discharge Medication List as of 2/10/2023 12:26 PM        START taking these medications    Details   nitrofurantoin, macrocrystal-monohydrate, (MACROBID) 100 MG capsule Take 1 capsule by mouth 2 times daily for 10 days, Disp-20 capsule, R-0Print      ondansetron (ZOFRAN-ODT) 4 MG disintegrating tablet Take 1 tablet by mouth every 12 hours as needed for Nausea or Vomiting, Disp-12 tablet, R-0Print      phenazopyridine (PYRIDIUM) 100 MG tablet Take 1 tablet by mouth 3 times daily as needed for Pain, Disp-9 tablet, R-0Print      famotidine (PEPCID) 20 MG tablet Take 0.5 tablets by mouth 2 times daily for 7 days, Disp-7 tablet, R-0Print             Diagnosis:  1. Acute cystitis without hematuria    2. Nausea and vomiting, unspecified vomiting type    3. Dehydration        Disposition:  Patient's disposition: Discharge to home  Patient's condition is stable. Indio Hong DO       *NOTE: This report was transcribed using voice recognition software. Every effort was made to ensure accuracy; however, inadvertent computerized transcription errors may be present.          Indio Hong DO  Resident  02/10/23 0573

## 2023-02-10 NOTE — DISCHARGE INSTRUCTIONS
Please follow-up with your primary care doctor. Please return ED for any worsening symptoms. Please stay well-hydrated.

## 2023-02-12 LAB — URINE CULTURE, ROUTINE: NORMAL

## 2023-02-15 ENCOUNTER — OFFICE VISIT (OUTPATIENT)
Dept: PRIMARY CARE CLINIC | Age: 23
End: 2023-02-15

## 2023-02-15 ENCOUNTER — TELEPHONE (OUTPATIENT)
Dept: PRIMARY CARE CLINIC | Age: 23
End: 2023-02-15

## 2023-02-15 VITALS
HEART RATE: 89 BPM | TEMPERATURE: 97.1 F | BODY MASS INDEX: 22.55 KG/M2 | OXYGEN SATURATION: 99 % | DIASTOLIC BLOOD PRESSURE: 60 MMHG | SYSTOLIC BLOOD PRESSURE: 98 MMHG | RESPIRATION RATE: 18 BRPM | WEIGHT: 148.8 LBS | HEIGHT: 68 IN

## 2023-02-15 DIAGNOSIS — N30.01 ACUTE CYSTITIS WITH HEMATURIA: ICD-10-CM

## 2023-02-15 DIAGNOSIS — A08.4 VIRAL GASTROENTERITIS: ICD-10-CM

## 2023-02-15 DIAGNOSIS — N30.01 ACUTE CYSTITIS WITH HEMATURIA: Primary | ICD-10-CM

## 2023-02-15 DIAGNOSIS — R30.0 DYSURIA: ICD-10-CM

## 2023-02-15 LAB
BILIRUBIN, POC: POSITIVE
BLOOD URINE, POC: NORMAL
CLARITY, POC: NORMAL
COLOR, POC: YELLOW
GLUCOSE URINE, POC: NORMAL
KETONES, POC: POSITIVE
LEUKOCYTE EST, POC: POSITIVE
NITRITE, POC: POSITIVE
PH, POC: 6
PROTEIN, POC: NORMAL
SPECIFIC GRAVITY, POC: 1.02
UROBILINOGEN, POC: NORMAL

## 2023-02-15 RX ORDER — CEFDINIR 300 MG/1
300 CAPSULE ORAL 2 TIMES DAILY
Qty: 10 CAPSULE | Refills: 0 | Status: SHIPPED | OUTPATIENT
Start: 2023-02-15 | End: 2023-02-20

## 2023-02-15 RX ORDER — CEFDINIR 300 MG/1
300 CAPSULE ORAL 2 TIMES DAILY
Qty: 10 CAPSULE | Refills: 0 | Status: SHIPPED
Start: 2023-02-15 | End: 2023-02-15 | Stop reason: SDUPTHER

## 2023-02-15 ASSESSMENT — PATIENT HEALTH QUESTIONNAIRE - PHQ9
SUM OF ALL RESPONSES TO PHQ QUESTIONS 1-9: 0
SUM OF ALL RESPONSES TO PHQ9 QUESTIONS 1 & 2: 0
1. LITTLE INTEREST OR PLEASURE IN DOING THINGS: 0
SUM OF ALL RESPONSES TO PHQ QUESTIONS 1-9: 0
2. FEELING DOWN, DEPRESSED OR HOPELESS: 0

## 2023-02-15 NOTE — TELEPHONE ENCOUNTER
Pharmacy called the below is on back order at all [facility] , pt asked to send to [facility] in [place] [facility] corner [address]          cefdinir (OMNICEF) 300 MG capsule

## 2023-02-15 NOTE — PROGRESS NOTES
[facility]  Department of Family Medicine      Patient:  Neelima Tang 20 y.o. female     Date of Service: 2/15/23      Chief complaint:   Chief Complaint   Patient presents with    ED Follow-up           History ofPresent Illness   The patient is a 20 y.o. female  presented to the clinic with complaints as above. Nausea and vomiting  -ED f/u  -found to have UTI and treated for that  -currently, states the antibiotics gave her dysuria and itching   -UC showed no growth   -RUQ us negative   -symptoms started Thursday, started as epigastric pain and nausea  -not throwing up, however does feel nauseous  -pepcid and zofran not helping  -last time she threw up was Thursday  -is having diarrhea  -can only tolerate soups right now      Past Medical History:  No past medical history on file. PastSurgical History:    No past surgical history on file. Allergies:    Patient has no known allergies.     Social History:   Social History     Socioeconomic History    Marital status:      Spouse name: Not on file    Number of children: Not on file    Years of education: Not on file    Highest education level: Not on file   Occupational History    Not on file   Tobacco Use    Smoking status: Never    Smokeless tobacco: Never   Substance and Sexual Activity    Alcohol use: Never    Drug use: Never    Sexual activity: Yes     Partners: Male   Other Topics Concern    Not on file   Social History Narrative    Not on file     Social Determinants of Health     Financial Resource Strain: Not on file   Food Insecurity: Not on file   Transportation Needs: Not on file   Physical Activity: Not on file   Stress: Not on file   Social Connections: Not on file   Intimate Partner Violence: Not on file   Housing Stability: Not on file        Family History:       Problem Relation Age of Onset    Diabetes Paternal Grandmother     Hypertension Paternal Grandmother     Cancer Paternal Grandmother Cancer Paternal Grandfather        Review of Systems:   Review of Systems - as above     Physical Exam   Vitals: BP 98/60   Pulse 89   Temp 97.1 °F (36.2 °C) (Infrared)   Resp 18   Ht 5' 8\" (1.727 m)   Wt 148 lb 12.8 oz (67.5 kg)   SpO2 99%   BMI 22.62 kg/m²   Physical Exam  Constitutional:       Appearance: She is well-developed. HENT:      Head: Normocephalic. Cardiovascular:      Rate and Rhythm: Normal rate and regular rhythm. Heart sounds: Normal heart sounds. No murmur heard. Pulmonary:      Effort: Pulmonary effort is normal. No respiratory distress. Breath sounds: Normal breath sounds. No wheezing. Abdominal:      General: Bowel sounds are normal.      Palpations: Abdomen is soft. Tenderness: There is abdominal tenderness (epigastric and suprapubic). Musculoskeletal:         General: No tenderness. Normal range of motion. Skin:     General: Skin is warm and dry. Neurological:      Mental Status: She is alert and oriented to person, place, and time. Psychiatric:         Behavior: Behavior normal.           Assessment and Plan       1. Acute cystitis with hematuria  ED f/u  -Does not seem to be responding to macrobid as UA suggestive of continuing infection, thus switched to omnicef and sent UC  - cefdinir (OMNICEF) 300 MG capsule; Take 1 capsule by mouth 2 times daily for 5 days  Dispense: 10 capsule; Refill: 0  - Culture, Urine; Future    2. Dysuria  - As above  - POCT Urinalysis no Micro    3. Viral gastroenteritis  ED f/u  -symptoms suggestive of viral gastroenteritis, supportive care for now, should get better within this week, advised call office if no improvement by Monday     Counseled regarding above diagnosis, including possible risks and complications,  especially if left uncontrolled.  Counseled regarding the possible side effects, risks, benefits and alternatives to treatment;patient and/or guardian verbalizes understanding, agrees, feels comfortable with and wishes to proceed with above treatment plan. Call or go to [phone] if symptoms worsen or persist. Advised patient to call with any new medication issues, and, as applicable, read all Rx info from pharmacy to assure aware of all possible risks and side effects of medicationbefore taking. Patient and/or guardian given opportunity to ask questions/raise concerns. The patient verbalized comfort and understanding ofinstructions. I encourage further reading and education about your health conditions. Information on many health conditions is provided by [facility] [employer]: [email or web address] [email or web address]  Please bring any questions to me at your nextvisit. Return to Office: Return if symptoms worsen or fail to improve. Medication List:    Current Outpatient Medications   Medication Sig Dispense Refill    cefdinir (OMNICEF) 300 MG capsule Take 1 capsule by mouth 2 times daily for 5 days 10 capsule 0    ondansetron (ZOFRAN-ODT) 4 MG disintegrating tablet Take 1 tablet by mouth every 12 hours as needed for Nausea or Vomiting 12 tablet 0    famotidine (PEPCID) 20 MG tablet Take 0.5 tablets by mouth 2 times daily for 7 days 7 tablet 0    levonorgestrel-ethinyl estradiol (AVIANE;ALESSE;LESSINA) 0.1-20 MG-MCG per tablet Take 1 tablet by mouth daily (Patient not taking: Reported on 2/15/2023) 1 packet 11    naproxen (NAPROSYN) 375 MG tablet Take 1 tablet by mouth 2 times daily (with meals) (Patient not taking: Reported on 2/15/2023) 60 tablet 0     No current facility-administered medications for this visit. Phyllis London,        This document may have been prepared at least partially through the use of voice recognition software. Although effort is taken to assure the accuracy ofthis document, it is possible that grammatical, syntax,  or spelling errors may occur.

## 2023-02-17 LAB
ORGANISM: ABNORMAL
URINE CULTURE, ROUTINE: ABNORMAL
URINE CULTURE, ROUTINE: ABNORMAL

## 2023-02-20 DIAGNOSIS — B37.31 VULVOVAGINAL CANDIDIASIS: Primary | ICD-10-CM

## 2023-02-20 RX ORDER — FLUCONAZOLE 150 MG/1
150 TABLET ORAL
Qty: 2 TABLET | Refills: 0 | Status: SHIPPED | OUTPATIENT
Start: 2023-02-20

## 2023-03-25 ENCOUNTER — HOSPITAL ENCOUNTER (EMERGENCY)
Age: 23
Discharge: HOME | End: 2023-03-25
Attending: EMERGENCY MEDICINE
Payer: MEDICAID

## 2023-03-25 VITALS
SYSTOLIC BLOOD PRESSURE: 106 MMHG | RESPIRATION RATE: 14 BRPM | DIASTOLIC BLOOD PRESSURE: 66 MMHG | BODY MASS INDEX: 21.98 KG/M2 | HEART RATE: 82 BPM | OXYGEN SATURATION: 100 % | HEIGHT: 68 IN | TEMPERATURE: 98.1 F | WEIGHT: 145 LBS

## 2023-03-25 DIAGNOSIS — R11.2 NAUSEA VOMITING AND DIARRHEA: Primary | ICD-10-CM

## 2023-03-25 DIAGNOSIS — R19.7 NAUSEA VOMITING AND DIARRHEA: Primary | ICD-10-CM

## 2023-03-25 LAB
ALBUMIN SERPL-MCNC: 4.4 G/DL (ref 3.5–5.2)
ALP SERPL-CCNC: 56 U/L (ref 35–104)
ALT SERPL-CCNC: 11 U/L (ref 0–32)
ANION GAP SERPL CALCULATED.3IONS-SCNC: 11 MMOL/L (ref 7–16)
AST SERPL-CCNC: 16 U/L (ref 0–31)
BACTERIA URNS QL MICRO: ABNORMAL /HPF
BASOPHILS # BLD: 0.01 E9/L (ref 0–0.2)
BASOPHILS NFR BLD: 0.3 % (ref 0–2)
BILIRUB SERPL-MCNC: 1.1 MG/DL (ref 0–1.2)
BILIRUB UR QL STRIP: ABNORMAL
BUN SERPL-MCNC: 12 MG/DL (ref 6–20)
CALCIUM SERPL-MCNC: 9.3 MG/DL (ref 8.6–10.2)
CHLORIDE SERPL-SCNC: 101 MMOL/L (ref 98–107)
CLARITY UR: CLEAR
CO2 SERPL-SCNC: 24 MMOL/L (ref 22–29)
COLOR UR: YELLOW
CREAT SERPL-MCNC: 0.6 MG/DL (ref 0.5–1)
EOSINOPHIL # BLD: 0.08 E9/L (ref 0.05–0.5)
EOSINOPHIL NFR BLD: 2 % (ref 0–6)
ERYTHROCYTE [DISTWIDTH] IN BLOOD BY AUTOMATED COUNT: 14.5 FL (ref 11.5–15)
GLUCOSE SERPL-MCNC: 79 MG/DL (ref 74–99)
GLUCOSE UR STRIP-MCNC: NEGATIVE MG/DL
HCG, URINE, POC: NEGATIVE
HCT VFR BLD AUTO: 41.9 % (ref 34–48)
HGB BLD-MCNC: 12.8 G/DL (ref 11.5–15.5)
HGB UR QL STRIP: NEGATIVE
IMM GRANULOCYTES # BLD: 0.01 E9/L
IMM GRANULOCYTES NFR BLD: 0.3 % (ref 0–5)
INFLUENZA A BY PCR: NOT DETECTED
INFLUENZA B BY PCR: NOT DETECTED
KETONES UR STRIP-MCNC: 15 MG/DL
LEUKOCYTE ESTERASE UR QL STRIP: NEGATIVE
LIPASE: 14 U/L (ref 13–60)
LYMPHOCYTES # BLD: 0.91 E9/L (ref 1.5–4)
LYMPHOCYTES NFR BLD: 22.9 % (ref 20–42)
Lab: NORMAL
MCH RBC QN AUTO: 24.5 PG (ref 26–35)
MCHC RBC AUTO-ENTMCNC: 30.5 % (ref 32–34.5)
MCV RBC AUTO: 80.3 FL (ref 80–99.9)
MONOCYTES # BLD: 0.5 E9/L (ref 0.1–0.95)
MONOCYTES NFR BLD: 12.6 % (ref 2–12)
NEGATIVE QC PASS/FAIL: NORMAL
NEUTROPHILS # BLD: 2.46 E9/L (ref 1.8–7.3)
NEUTS SEG NFR BLD: 61.9 % (ref 43–80)
NITRITE UR QL STRIP: NEGATIVE
PH UR STRIP: 6 [PH] (ref 5–9)
PLATELET # BLD AUTO: 241 E9/L (ref 130–450)
PMV BLD AUTO: 10.5 FL (ref 7–12)
POSITIVE QC PASS/FAIL: NORMAL
POTASSIUM SERPL-SCNC: 3.2 MMOL/L (ref 3.5–5)
PROT SERPL-MCNC: 8.5 G/DL (ref 6.4–8.3)
PROT UR STRIP-MCNC: ABNORMAL MG/DL
RBC # BLD AUTO: 5.22 E12/L (ref 3.5–5.5)
RBC #/AREA URNS HPF: ABNORMAL /HPF (ref 0–2)
SARS-COV-2 RDRP RESP QL NAA+PROBE: NOT DETECTED
SODIUM SERPL-SCNC: 136 MMOL/L (ref 132–146)
SP GR UR STRIP: 1.02 (ref 1–1.03)
UROBILINOGEN UR STRIP-ACNC: 0.2 E.U./DL
WBC # BLD: 4 E9/L (ref 4.5–11.5)
WBC #/AREA URNS HPF: ABNORMAL /HPF (ref 0–5)

## 2023-03-25 PROCEDURE — 87635 SARS-COV-2 COVID-19 AMP PRB: CPT

## 2023-03-25 PROCEDURE — 81001 URINALYSIS AUTO W/SCOPE: CPT

## 2023-03-25 PROCEDURE — 99284 EMERGENCY DEPT VISIT MOD MDM: CPT

## 2023-03-25 PROCEDURE — 80053 COMPREHEN METABOLIC PANEL: CPT

## 2023-03-25 PROCEDURE — 96374 THER/PROPH/DIAG INJ IV PUSH: CPT

## 2023-03-25 PROCEDURE — 85025 COMPLETE CBC W/AUTO DIFF WBC: CPT

## 2023-03-25 PROCEDURE — 83690 ASSAY OF LIPASE: CPT

## 2023-03-25 PROCEDURE — 2580000003 HC RX 258

## 2023-03-25 PROCEDURE — 87502 INFLUENZA DNA AMP PROBE: CPT

## 2023-03-25 PROCEDURE — 6360000002 HC RX W HCPCS

## 2023-03-25 RX ORDER — 0.9 % SODIUM CHLORIDE 0.9 %
1000 INTRAVENOUS SOLUTION INTRAVENOUS ONCE
Status: COMPLETED | OUTPATIENT
Start: 2023-03-25 | End: 2023-03-25

## 2023-03-25 RX ORDER — ONDANSETRON 4 MG/1
4 TABLET, ORALLY DISINTEGRATING ORAL 3 TIMES DAILY PRN
Qty: 8 TABLET | Refills: 0 | Status: SHIPPED | OUTPATIENT
Start: 2023-03-25

## 2023-03-25 RX ORDER — ONDANSETRON 2 MG/ML
4 INJECTION INTRAMUSCULAR; INTRAVENOUS ONCE
Status: COMPLETED | OUTPATIENT
Start: 2023-03-25 | End: 2023-03-25

## 2023-03-25 RX ADMIN — SODIUM CHLORIDE 1000 ML: 9 INJECTION, SOLUTION INTRAVENOUS at 13:09

## 2023-03-25 RX ADMIN — ONDANSETRON 4 MG: 2 INJECTION INTRAMUSCULAR; INTRAVENOUS at 13:08

## 2023-03-25 ASSESSMENT — LIFESTYLE VARIABLES
HOW OFTEN DO YOU HAVE A DRINK CONTAINING ALCOHOL: NEVER
HOW MANY STANDARD DRINKS CONTAINING ALCOHOL DO YOU HAVE ON A TYPICAL DAY: PATIENT DOES NOT DRINK

## 2023-03-25 ASSESSMENT — PAIN DESCRIPTION - LOCATION: LOCATION: ABDOMEN

## 2023-03-25 ASSESSMENT — PAIN SCALES - GENERAL: PAINLEVEL_OUTOF10: 10

## 2023-03-25 NOTE — Clinical Note
Bubba Kerr was seen and treated in our emergency department on 3/25/2023. She may return to work on 03/27/2023. If you have any questions or concerns, please don't hesitate to call.       Simeon Hernandez, DO

## 2023-06-28 ENCOUNTER — OFFICE VISIT (OUTPATIENT)
Dept: PRIMARY CARE CLINIC | Age: 23
End: 2023-06-28
Payer: MEDICAID

## 2023-06-28 VITALS
OXYGEN SATURATION: 100 % | HEART RATE: 85 BPM | WEIGHT: 148 LBS | HEIGHT: 68 IN | DIASTOLIC BLOOD PRESSURE: 62 MMHG | SYSTOLIC BLOOD PRESSURE: 90 MMHG | BODY MASS INDEX: 22.43 KG/M2 | TEMPERATURE: 97.1 F | RESPIRATION RATE: 17 BRPM

## 2023-06-28 DIAGNOSIS — Z00.00 ANNUAL PHYSICAL EXAM: Primary | ICD-10-CM

## 2023-06-28 DIAGNOSIS — F41.1 GAD (GENERALIZED ANXIETY DISORDER): ICD-10-CM

## 2023-06-28 DIAGNOSIS — Z00.00 ANNUAL PHYSICAL EXAM: ICD-10-CM

## 2023-06-28 LAB
ALBUMIN SERPL-MCNC: 4.3 G/DL (ref 3.5–5.2)
ALP SERPL-CCNC: 54 U/L (ref 35–104)
ALT SERPL-CCNC: 9 U/L (ref 0–32)
ANION GAP SERPL CALCULATED.3IONS-SCNC: 9 MMOL/L (ref 7–16)
AST SERPL-CCNC: 16 U/L (ref 0–31)
BILIRUB SERPL-MCNC: 0.7 MG/DL (ref 0–1.2)
BUN SERPL-MCNC: 8 MG/DL (ref 6–20)
CALCIUM SERPL-MCNC: 9.3 MG/DL (ref 8.6–10.2)
CHLORIDE SERPL-SCNC: 104 MMOL/L (ref 98–107)
CO2 SERPL-SCNC: 25 MMOL/L (ref 22–29)
CREAT SERPL-MCNC: 0.6 MG/DL (ref 0.5–1)
ERYTHROCYTE [DISTWIDTH] IN BLOOD BY AUTOMATED COUNT: 14.4 FL (ref 11.5–15)
GLUCOSE SERPL-MCNC: 79 MG/DL (ref 74–99)
HCT VFR BLD AUTO: 37.8 % (ref 34–48)
HGB BLD-MCNC: 11.7 G/DL (ref 11.5–15.5)
MCH RBC QN AUTO: 25.2 PG (ref 26–35)
MCHC RBC AUTO-ENTMCNC: 31 % (ref 32–34.5)
MCV RBC AUTO: 81.3 FL (ref 80–99.9)
PLATELET # BLD AUTO: 257 E9/L (ref 130–450)
PMV BLD AUTO: 10.7 FL (ref 7–12)
POTASSIUM SERPL-SCNC: 3.7 MMOL/L (ref 3.5–5)
PROT SERPL-MCNC: 7.4 G/DL (ref 6.4–8.3)
RBC # BLD AUTO: 4.65 E12/L (ref 3.5–5.5)
SODIUM SERPL-SCNC: 138 MMOL/L (ref 132–146)
TSH SERPL-MCNC: 0.9 UIU/ML (ref 0.27–4.2)
WBC # BLD: 4.6 E9/L (ref 4.5–11.5)

## 2023-06-28 PROCEDURE — 99395 PREV VISIT EST AGE 18-39: CPT | Performed by: STUDENT IN AN ORGANIZED HEALTH CARE EDUCATION/TRAINING PROGRAM

## 2023-06-28 SDOH — ECONOMIC STABILITY: HOUSING INSECURITY
IN THE LAST 12 MONTHS, WAS THERE A TIME WHEN YOU DID NOT HAVE A STEADY PLACE TO SLEEP OR SLEPT IN A SHELTER (INCLUDING NOW)?: NO

## 2023-06-28 SDOH — ECONOMIC STABILITY: FOOD INSECURITY: WITHIN THE PAST 12 MONTHS, YOU WORRIED THAT YOUR FOOD WOULD RUN OUT BEFORE YOU GOT MONEY TO BUY MORE.: NEVER TRUE

## 2023-06-28 SDOH — ECONOMIC STABILITY: INCOME INSECURITY: HOW HARD IS IT FOR YOU TO PAY FOR THE VERY BASICS LIKE FOOD, HOUSING, MEDICAL CARE, AND HEATING?: SOMEWHAT HARD

## 2023-06-28 SDOH — ECONOMIC STABILITY: FOOD INSECURITY: WITHIN THE PAST 12 MONTHS, THE FOOD YOU BOUGHT JUST DIDN'T LAST AND YOU DIDN'T HAVE MONEY TO GET MORE.: NEVER TRUE

## 2023-06-30 ENCOUNTER — TELEPHONE (OUTPATIENT)
Dept: PRIMARY CARE CLINIC | Age: 23
End: 2023-06-30

## 2023-07-17 NOTE — ED PROVIDER NOTES
[address]        Pt Name: Pernell Deluca  MRN: [id number]  [place] 2000  Date of evaluation: 3/25/2023  Provider: July Yin DO  PCP: Adan Gomez DO  Note Started: 1:00 PM EDT 3/25/23    CHIEF COMPLAINT       Chief Complaint   Patient presents with    Nausea     N/D x 24 hours denies emesis,  and cousin have same symptoms    Abdominal Pain     Epigastric \"burning\"    Diarrhea       HISTORY OF PRESENT ILLNESS: 1 or more Elements   History From: patient    Limitations to history : None    Pernell Deluca is a 20 y.o. female who presents to the emergency department for nausea, vomiting, diarrhea since yesterday. Complaints been intermittent, moderate in severity, nothing makes better or worse. Patient has multiple sick contacts at home with similar complaints. Has some epigastric pain from vomiting. She was seen in urgent care and was prescribed Zofran for gastroenteritis but reports that there is nothing sent to her pharmacy so she came to emergency department for evaluation. Cannot see any laboratory work done at urgent care. Last normal menstrual period was 6 days ago. Patient denies fever, chills, headache, shortness of breath, chest pain, lightheadedness, dysuria, hematuria, hematochezia, and melena. Nursing Notes were all reviewed and agreed with or any disagreements were addressed in the HPI. REVIEW OF SYSTEMS :           Positives and Pertinent negatives as per HPI. SURGICAL HISTORY   No past surgical history on file.     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 Plan: No treatment is required at this time. Will continue to observe condition and or symptoms. tablet, R-0Print      levonorgestrel-ethinyl estradiol (AVIANE;ALESSE;LESSINA) 0.1-20 MG-MCG per tablet Take 1 tablet by mouth daily, Disp-1 packet, R-11Normal      naproxen (NAPROSYN) 375 MG tablet Take 1 tablet by mouth 2 times daily (with meals), Disp-60 tablet, R-0Print             ALLERGIES     Patient has no known allergies. FAMILYHISTORY       Family History   Problem Relation Age of Onset    Diabetes Paternal Grandmother     Hypertension Paternal Grandmother     Cancer Paternal Grandmother     Cancer Paternal Grandfather         SOCIAL HISTORY       Social History     Tobacco Use    Smoking status: Never    Smokeless tobacco: Never   Substance Use Topics    Alcohol use: Never    Drug use: Never       SCREENINGS        [place] Coma Scale  Eye Opening: Spontaneous  Best Verbal Response: Oriented  Best Motor Response: Obeys commands  Jamal Coma Scale Score: 15                CIWA Assessment  BP: 106/66  Heart Rate: 82           PHYSICAL EXAM  1 or more Elements     ED Triage Vitals [03/25/23 1151]   BP Temp Temp Source Heart Rate Resp SpO2 Height Weight   110/72 98.1 °F (36.7 °C) Oral 90 14 100 % 5' 8\" (1.727 m) 145 lb (65.8 kg)                Constitutional/General: Alert and oriented x3  Head: Normocephalic and atraumatic  Eyes: PERRL, EOMI, conjunctiva normal, sclera non icteric  ENT:  Oropharynx clear, handling secretions, no trismus, no asymmetry of the posterior oropharynx or uvular edema. Dry mucous membranes. Neck: Supple, full ROM, no stridor, no meningeal signs  Respiratory: Lungs clear to auscultation bilaterally, no wheezes, rales, or rhonchi. Not in respiratory distress  Cardiovascular:  Regular rate. Regular rhythm. Equal extremity pulses. Chest: No chest wall tenderness  GI:  Abdomen Soft, mild epigastric tender, Non distended. +BS. No rebound, guarding, or rigidity. No pulsatile masses. Musculoskeletal: Moves all extremities x 4.  Warm and well perfused, no clubbing, no cyanosis, no

## 2023-10-03 ENCOUNTER — TELEPHONE (OUTPATIENT)
Dept: PRIMARY CARE CLINIC | Age: 23
End: 2023-10-03

## 2023-10-03 NOTE — TELEPHONE ENCOUNTER
Patient called states she is out of town and having a possible pregnancy problem. States on Friday she was bleeding and something abnormal \"came out\". She took two pregnancy tests since then and both were positive. She also called her gyn office for recommendations which was to seek medical attention asap. I agreed and advised she go to ED. Patient verbalized understanding and stated she would go.